# Patient Record
Sex: FEMALE | Race: WHITE | HISPANIC OR LATINO | Employment: FULL TIME | URBAN - METROPOLITAN AREA
[De-identification: names, ages, dates, MRNs, and addresses within clinical notes are randomized per-mention and may not be internally consistent; named-entity substitution may affect disease eponyms.]

---

## 2017-12-01 ENCOUNTER — OFFICE VISIT (OUTPATIENT)
Dept: OBSTETRICS AND GYNECOLOGY | Age: 47
End: 2017-12-01

## 2017-12-01 VITALS
BODY MASS INDEX: 25.23 KG/M2 | WEIGHT: 157 LBS | SYSTOLIC BLOOD PRESSURE: 122 MMHG | DIASTOLIC BLOOD PRESSURE: 72 MMHG | HEIGHT: 66 IN

## 2017-12-01 DIAGNOSIS — Z11.51 SCREENING FOR HPV (HUMAN PAPILLOMAVIRUS): ICD-10-CM

## 2017-12-01 DIAGNOSIS — Z80.41 FAMILY HISTORY OF OVARIAN CANCER: ICD-10-CM

## 2017-12-01 DIAGNOSIS — Z01.419 WELL WOMAN EXAM WITH ROUTINE GYNECOLOGICAL EXAM: Primary | ICD-10-CM

## 2017-12-01 PROCEDURE — 99386 PREV VISIT NEW AGE 40-64: CPT | Performed by: PHYSICIAN ASSISTANT

## 2017-12-01 NOTE — PROGRESS NOTES
Subjective     Chief Complaint   Patient presents with   • Gynecologic Exam     annual exam       History of Present Illness    Erick Quiñones is a 47 y.o.  who presents for annual exam.    She is not on BC.  She is not ok with pregnancy. She is tracking her cycles. Declines anything more    She does note a little discomfort vaginally after IC.  Had this when she saw me 4 years ago.  Is using lubricant. Denies odor or d/c.  No risk for STD    Her sister is Binta Quiñones and sees Dr Mills also. They are from [place], been here since .  Went back for a visit in .  Would like to get back.  She has a 23 yo son that is taking the LSAT Saturday.  Her other son is 15 yoa and attends [employer].     She has a family h/o ovarian cancer in her [facility]. Never had genetic testing done.     She is a pt of Dr Mills's.    Her menses are regular every 28-30 days, lasting 4-7 days, dysmenorrhea none   Obstetric History:  OB History      Para Term  AB Living    4 2 2  2 2    SAB TAB Ectopic Multiple Live Births    2    2         Menstrual History:     Patient's last menstrual period was 2017 (exact date).         Current contraception: none  History of abnormal Pap smear: no  Received Gardasil immunization: no  Perform regular self breast exam: yes - [facility]  Family history of uterine or ovarian cancer: yes - [facility]  Family History of colon cancer: no  Family history of breast cancer: no    Mammogram: done today.  Colonoscopy: not indicated.  DEXA: not indicated.    Exercise: moderately active  Calcium/Vitamin D: adequate intake    The following portions of the patient's history were reviewed and updated as appropriate: allergies, current medications, past family history, past medical history, past social history, past surgical history and problem list.    Review of Systems    Review of Systems   Constitutional: Negative for fatigue.   Respiratory: Negative for shortness of breath.    Gastrointestinal:  "Negative for abdominal pain.   Genitourinary: Negative for dysuria.   Neurological: Negative for headaches.   Psychiatric/Behavioral: Negative for dysphoric mood.         Objective   Physical Exam    /72  Ht 66\" (167.6 cm)  Wt 157 lb (71.2 kg)  LMP 11/16/2017 (Exact Date)  Breastfeeding? No  BMI 25.34 kg/m2    General:   alert, appears stated age and cooperative   Neck: no adenopathy and thyroid normal to palpation   Heart: regular rate and rhythm   Lungs: clear to auscultation bilaterally   Abdomen: soft and nontender   Breast: inspection negative, no nipple discharge or bleeding, no masses or nodularity palpable and implants intact   Vulva: normal   Vagina: normal mucosa, normal discharge, ph increased   Cervix: no lesions   Uterus: normal size, non-tender   Adnexa: normal adnexa and no mass, fullness, tenderness   Rectal: not indicated     Assessment/Plan   Erick was seen today for gynecologic exam.    Diagnoses and all orders for this visit:    Well woman exam with routine gynecological exam  -     IGP, Apt HPV,rfx 16 / 18,45 - ThinPrep Vial, Cervix    Screening for HPV (human papillomavirus)  -     IGP, Apt HPV,rfx 16 / 18,45 - ThinPrep Vial, Cervix    Family history of ovarian cancer  -     [facility] Hereditary Cancer Screen        All questions answered.  Breast self exam technique reviewed and patient encouraged to perform self-exam monthly.  Discussed healthy lifestyle modifications.  Recommended 30 minutes of aerobic exercise five times per week.  Discussed calcium needs to prevent osteoporosis.      Disc pap guidelines, will do pap with HPV  Disc likely BV, gave sample of Clindesse  Plan myrisk testing based on family history.  Declines flu shot           "

## 2017-12-06 LAB
CYTOLOGIST CVX/VAG CYTO: NORMAL
CYTOLOGY CVX/VAG DOC THIN PREP: NORMAL
DX ICD CODE: NORMAL
HIV 1 & 2 AB SER-IMP: NORMAL
HPV I/H RISK 4 DNA CVX QL PROBE+SIG AMP: NEGATIVE
Lab: NORMAL
OTHER STN SPEC: NORMAL
PATH REPORT.FINAL DX SPEC: NORMAL
STAT OF ADQ CVX/VAG CYTO-IMP: NORMAL

## 2017-12-14 ENCOUNTER — TELEPHONE (OUTPATIENT)
Dept: OBSTETRICS AND GYNECOLOGY | Age: 47
End: 2017-12-14

## 2017-12-14 NOTE — TELEPHONE ENCOUNTER
Left message      Let her know that her myrisk is negative. And I will be putting a copy for her in the mail.

## 2018-04-26 ENCOUNTER — OFFICE VISIT (OUTPATIENT)
Dept: OBSTETRICS AND GYNECOLOGY | Age: 48
End: 2018-04-26

## 2018-04-26 ENCOUNTER — PROCEDURE VISIT (OUTPATIENT)
Dept: OBSTETRICS AND GYNECOLOGY | Age: 48
End: 2018-04-26

## 2018-04-26 VITALS
HEIGHT: 66 IN | SYSTOLIC BLOOD PRESSURE: 110 MMHG | BODY MASS INDEX: 25.55 KG/M2 | DIASTOLIC BLOOD PRESSURE: 74 MMHG | WEIGHT: 159 LBS

## 2018-04-26 DIAGNOSIS — R30.0 BURNING WITH URINATION: ICD-10-CM

## 2018-04-26 DIAGNOSIS — Z11.3 SCREEN FOR STD (SEXUALLY TRANSMITTED DISEASE): ICD-10-CM

## 2018-04-26 DIAGNOSIS — R10.2 PELVIC PAIN: ICD-10-CM

## 2018-04-26 DIAGNOSIS — N76.1 SUBACUTE VAGINITIS: Primary | ICD-10-CM

## 2018-04-26 DIAGNOSIS — R10.2 PELVIC PAIN: Primary | ICD-10-CM

## 2018-04-26 PROBLEM — F32.A DEPRESSION: Status: ACTIVE | Noted: 2018-04-26

## 2018-04-26 LAB
BILIRUB BLD-MCNC: NEGATIVE MG/DL
GLUCOSE UR STRIP-MCNC: NEGATIVE MG/DL
KETONES UR QL: NEGATIVE
LEUKOCYTE EST, POC: NEGATIVE
NITRITE UR-MCNC: NEGATIVE MG/ML
PH UR: 7 [PH] (ref 5–8)
PROT UR STRIP-MCNC: NEGATIVE MG/DL
RBC # UR STRIP: ABNORMAL /UL
SP GR UR: 1.02 (ref 1–1.03)
UROBILINOGEN UR QL: NORMAL

## 2018-04-26 PROCEDURE — 81003 URINALYSIS AUTO W/O SCOPE: CPT | Performed by: OBSTETRICS & GYNECOLOGY

## 2018-04-26 PROCEDURE — 99214 OFFICE O/P EST MOD 30 MIN: CPT | Performed by: OBSTETRICS & GYNECOLOGY

## 2018-04-26 PROCEDURE — 76830 TRANSVAGINAL US NON-OB: CPT | Performed by: OBSTETRICS & GYNECOLOGY

## 2018-04-26 RX ORDER — IBUPROFEN 800 MG/1
800 TABLET ORAL EVERY 6 HOURS PRN
COMMUNITY
Start: 2016-06-27 | End: 2022-04-18

## 2018-04-26 NOTE — PROGRESS NOTES
GYN PROBLEM EXAM                                    2018    Subjective   Erick Quiñones is a 47 y.o.  Female,      Chief complaint:  Follow-up (ERICK IS HERE DUE TO BURNING WITH URINATION AND PAIN AFTER INTERCOURSE.  SHE HAD AN ANNUAL EXAM WITH SMITHA IN 2017. ALSO EXPERIENCING PELVIC PRESSURE, CONCERNED DUE TO MGM OVARIAN CANCER, NOT SURE WHAT AGE SHE WAS DX. )    History of Present Illness:  Erick complains of a burning sensation in the vagina after every episode of intercourse with her .  This occurs about a day or 2 after the intercourse and has been going on at least for the last year.  She has had the same partner for the last 18 years.  At this time she also gets a pressure sensation in the lower abdomen, in the pelvis, as if something is going to fall out.  She has had some relief with an over-the-counter vaginal yeast cream, a homeopathic concoction.  But it does help.     Her last episode of intercourse was , , and her symptoms started again yesterday .    She also has some dysuria, mostly on the outside of the vagina, but also a little in the distal urethra.  She has no pain during intercourse and no bleeding.  She does have a thick discharge that appears after intercourse.  She has no itching, no odor.  She has tried the Summer's Teena medicated douche before and it did help some.  She has not found any help with Clindesse vaginal cream.    She occasionally has stress urinary incontinence with sit ups or crunches.  She also loses a little urine with jumping jacks.  We discussed Kegel's exercises.    .    The following portions of the patient's history were reviewed / updated as appropriate:   allergies, current medications,  past medical history, past surgical history, past family history, past social history, and problem list.     Her mother had breast cancer at age 52 and her maternal grandmother had skin cancer (?? Melanoma) and an  "ovarian cancer in her 40s. Erick had the Biba genetic testing done and was negative.    Review of Systems: She has regular menstrual cycles every 29 days.,  Lasting 3-4 days.  She is uncertain if she can tell when ovulation occurs.  She does not have regular molimina.  Her Pap smear in December was negative, negative high-risk HPV.      /74   Ht 167.6 cm (66\")   Wt 72.1 kg (159 lb)   LMP 04/10/2018   Breastfeeding? No   BMI 25.66 kg/m²     Objective   OBGyn Exam     PHYSICAL EXAM      Well-developed, well-nourished,  female, in no distress, alert and well oriented ×3.        Abdomen not examined.               Pelvic exam :  Vulva normal.           External genitalia normal.  BUS negative.             Introitus normal.  Vaginal mucosa normal.  Normal watery white discharge.  Wet prep negative, no yeast, some possible clue cells, no Trichomonas.           Cervix normal, small cervical polyp at the external os, copious watery discharge, looks like ovulation.  Vaginal NuSwab plus taken.  No cervical motion tenderness.          Uterus anteverted, normal size, normal shape, and position, a little tender at the fundus.          Adnexa are negative bilaterally.  No tenderness.  No palpable mass.          Rectovaginal exam negative .          Mood and affect is normal.  Behavior normal.  Thought content normal.            Judgment normal.      PELVIC ULTRASOUND-because of pelvic pressure/pain        Uterus:  Anteverted.  Measures 9.4 x 5.3 x 4.4 cm, with a volume of 14.53 cc's.  Endometrial stripe measures 9.3 mm.  There is a small fundal fibroid measuring 1.1 x 1.1 cm.  It is pretty much midplane and indents the endometrial cavity a little bit of the very top.  There may also be a subcentimeter fibroid on the anterior surface of the uterus just under the serosa.  It does not shadow.  This is in the area of tenderness.     Left ovary:  These measurements are not labeled correctly.  But the " left ovary measures 2.9 x 1.8 x 2.4 cm.  Does contain several small follicles.     Right ovary:  Measured 2.5 x 2.4 x 1.9 cm, with a volume of 6.15 cc's.  It also contains a simple cyst measuring 1.4 x 1.3 cm.     Cul-de-sac:  Days no free fluid or masses.       Assessment:  Normal uterus and ovaries.  Very small fibroid(s).  Nothing to explain her pelvic symptoms.    Recommendation:  Labs for vaginitis and cervicitis ordered.  We will expect to use a trial of vaginal estrogen or Intrarosa.       Assessment/Plan   Erick was seen today for follow-up.    Diagnoses and all orders for this visit:    Subacute vaginitis  -     NuSwab Vaginitis (VG) - Swab, Vagina    Pelvic pain  -     Chlamydia trachomatis, Neisseria gonorrhoeae, PCR w/ confirmation - Swab, Cervix    Burning with urination  -     POC Urinalysis Dipstick, Automated  -     Urine Culture - Urine, Urine, Clean Catch    Screen for STD (sexually transmitted disease)  -     Chlamydia trachomatis, Neisseria gonorrhoeae, PCR w/ confirmation - Swab, Cervix      COMMENTS: The vaginal and pelvic exam is normal.  The ultrasound confirms normal ovaries and a normal-appearing uterus.  There may be a small subserosal anterior uterine fibroid.     I am uncertain of the etiology of her pain/burning.  The wet prep did have a significant number of parabasal cells, but also very many very mature epithelial cells!       If all the tests are normal think it would be worthwhile to give her a trial of either vaginal estrogen or Intrarosa vaginal suppositories.  The cervix and cervical discharge looked very well estrogenized, very clear, lots of spinnbarkeit.     Another possibility could be endometriosis with some internal adhesions that become irritated during intercourse.  Could consider suppression of ovulation with birth control pills or Depo-Provera.    Petey Mills MD  4/26/2018

## 2018-04-28 LAB
C TRACH RRNA SPEC QL NAA+PROBE: NEGATIVE
N GONORRHOEA RRNA SPEC QL NAA+PROBE: NEGATIVE

## 2018-04-29 LAB
BACTERIA UR CULT: NO GROWTH
BACTERIA UR CULT: NORMAL

## 2018-04-30 LAB
A VAGINAE DNA VAG QL NAA+PROBE: NORMAL SCORE
BVAB2 DNA VAG QL NAA+PROBE: NORMAL SCORE
C ALBICANS DNA VAG QL NAA+PROBE: NEGATIVE
C GLABRATA DNA VAG QL NAA+PROBE: NEGATIVE
MEGA1 DNA VAG QL NAA+PROBE: NORMAL SCORE
T VAGINALIS RRNA SPEC QL NAA+PROBE: NEGATIVE

## 2018-04-30 NOTE — PROGRESS NOTES
April 30, 2018.  6:40 PM.  Notify Erick that her urine culture showed no growth, the cervical swab test for gonorrhea and chlamydia were both negative, and the vaginal swab test for bacterial vaginosis, yeast, and Trichomonas, were all 3 negative.  I think her symptoms are most likely due to vaginal atrophy and would improve with the use of vaginal estrogen.

## 2018-05-01 ENCOUNTER — DOCUMENTATION (OUTPATIENT)
Dept: OBSTETRICS AND GYNECOLOGY | Age: 48
End: 2018-05-01

## 2018-05-01 NOTE — PROGRESS NOTES
May 1, 2018.  1:31 PM  Erick' cultures and labs came back negative.  We have decided to try and treat her pain related to intercourse with Intra-Ivone vaginal suppositories.

## 2019-07-23 ENCOUNTER — OFFICE VISIT (OUTPATIENT)
Dept: OBSTETRICS AND GYNECOLOGY | Age: 49
End: 2019-07-23

## 2019-07-23 VITALS
SYSTOLIC BLOOD PRESSURE: 112 MMHG | HEIGHT: 66 IN | WEIGHT: 161 LBS | BODY MASS INDEX: 25.88 KG/M2 | DIASTOLIC BLOOD PRESSURE: 66 MMHG

## 2019-07-23 DIAGNOSIS — R31.29 OTHER MICROSCOPIC HEMATURIA: ICD-10-CM

## 2019-07-23 DIAGNOSIS — N94.9 VAGINAL BURNING: Primary | ICD-10-CM

## 2019-07-23 DIAGNOSIS — N95.2 ATROPHIC VAGINITIS: ICD-10-CM

## 2019-07-23 DIAGNOSIS — Z12.31 SCREENING MAMMOGRAM, ENCOUNTER FOR: ICD-10-CM

## 2019-07-23 LAB
BILIRUB BLD-MCNC: NEGATIVE MG/DL
CLARITY, POC: CLEAR
COLOR UR: YELLOW
GLUCOSE UR STRIP-MCNC: NEGATIVE MG/DL
KETONES UR QL: NEGATIVE
LEUKOCYTE EST, POC: NEGATIVE
NITRITE UR-MCNC: NEGATIVE MG/ML
PH UR: 7 [PH] (ref 5–8)
PROT UR STRIP-MCNC: NEGATIVE MG/DL
RBC # UR STRIP: ABNORMAL /UL
SP GR UR: 1.02 (ref 1–1.03)
UROBILINOGEN UR QL: NORMAL

## 2019-07-23 PROCEDURE — 81002 URINALYSIS NONAUTO W/O SCOPE: CPT | Performed by: PHYSICIAN ASSISTANT

## 2019-07-23 PROCEDURE — 99213 OFFICE O/P EST LOW 20 MIN: CPT | Performed by: PHYSICIAN ASSISTANT

## 2019-07-23 RX ORDER — AMOXICILLIN 500 MG/1
1000 CAPSULE ORAL 2 TIMES DAILY
COMMUNITY
End: 2020-03-25

## 2019-07-23 NOTE — PROGRESS NOTES
"Subjective     Chief Complaint   Patient presents with   • Gynecologic Exam     c/o after intercourse there is a burning/itching sensation       Erick Quiñones is a 48 y.o.  whose LMP is Patient's last menstrual period was 2019 (exact date). presents with vaginal burning and discomfort with uriantion    Pt is here for vaginal discomfort  Notes burning and itching  Uses lubricant with IC  Has been given intrarosa but it was too pricey  Had full w/u with Dr Mills last year and r/o vaginal infection  dxed with atrophic vaginitis    Still having menses  They are regular  Denies MP sx's but would like ot have her hormones checked    Pt's Mom diagnosed with breast cancer again  Had mastectomy of right breast  Had lumpectomy previously    Pt overdue for annual and mammogram  Would like mammogram ordered out as she is unable to get it done here until October    Pt of dr Raymond now    No Additional Complaints Reported    The following portions of the patient's history were reviewed and updated as appropriate:vital signs, allergies, current medications, past family history, past medical history, past social history, past surgical history and problem list      Review of Systems   Genitourinary:positive for dyspareunia and burning after urination     Objective      /66   Ht 167.6 cm (66\")   Wt 73 kg (161 lb)   LMP 2019 (Exact Date)   Breastfeeding? No   BMI 25.99 kg/m²     Physical Exam    General:   alert, comfortable and no distress   Heart: regular rate and rhythm   Lungs: Not performed today.   Breast: Not performed today   Neck: na   Abdomen: {Not performed today   CVA: Not performed today   Pelvis: External genitalia: normal general appearance  Vaginal: discharge, clear  Cervix: normal appearance  Adnexa: normal bimanual exam  Uterus: normal single, nontender   Extremities: Not performed today   Neurologic: negative   Psychiatric: Normal affect, judgement, and mood       Lab Review "   Labs: Urinalysis - with micro     Imaging   No data reviewed    Assessment/Plan     ASSESSMENT  1. Vaginal burning    2. Other microscopic hematuria    3. Screening mammogram, encounter for    4. Atrophic vaginitis        PLAN  1.   Orders Placed This Encounter   Procedures   • Urine Culture - Urine, Urine, Random Void   • NuSwab BV & Candida - Swab, Vagina   • Mammo Screening Digital Tomosynthesis Bilateral With CAD   • Follicle Stimulating Hormone   • TSH   • Ambulatory Referral to Family Practice   • POC Urinalysis Dipstick   • Urinalysis With Microscopic - Urine, Random Void       2. Medications prescribed this encounter:        New Medications Ordered This Visit   Medications   • conjugated estrogens (PREMARIN) 0.625 MG/GM vaginal cream     Sig: Insert  into the vagina Daily.     Dispense:  30 g     Refill:  0       3. R/o infection but plan to start premarin. Sample given, will send in rx when pt calls. Coupon card given    4. Labs ordered but plan f/u with PCP.  Pt would like to be referred to new PCP so referral has been placed    Follow up: 4 week(s) for annual    DAFNE Patterson  7/23/2019

## 2019-07-24 LAB
FSH SERPL-ACNC: 3.3 MIU/ML
TSH SERPL DL<=0.005 MIU/L-ACNC: 4.49 MIU/ML (ref 0.27–4.2)

## 2019-07-25 LAB
A VAGINAE DNA VAG QL NAA+PROBE: NORMAL SCORE
BVAB2 DNA VAG QL NAA+PROBE: NORMAL SCORE
C ALBICANS DNA VAG QL NAA+PROBE: NEGATIVE
C GLABRATA DNA VAG QL NAA+PROBE: NEGATIVE
MEGA1 DNA VAG QL NAA+PROBE: NORMAL SCORE

## 2019-07-27 LAB
APPEARANCE UR: CLEAR
BACTERIA #/AREA URNS HPF: ABNORMAL /HPF
BACTERIA UR CULT: ABNORMAL
BACTERIA UR CULT: ABNORMAL
BILIRUB UR QL STRIP: NEGATIVE
CASTS URNS MICRO: ABNORMAL
COLOR UR: YELLOW
EPI CELLS #/AREA URNS HPF: ABNORMAL /HPF
GLUCOSE UR QL: NEGATIVE
HGB UR QL STRIP: (no result)
KETONES UR QL STRIP: NEGATIVE
LEUKOCYTE ESTERASE UR QL STRIP: NEGATIVE
NITRITE UR QL STRIP: NEGATIVE
OTHER ANTIBIOTIC SUSC ISLT: ABNORMAL
PH UR STRIP: 7 [PH] (ref 5–8)
PROT UR QL STRIP: NEGATIVE
RBC #/AREA URNS HPF: ABNORMAL /HPF
SP GR UR: 1.02 (ref 1–1.03)
UROBILINOGEN UR STRIP-MCNC: (no result) MG/DL
WBC #/AREA URNS HPF: ABNORMAL /HPF

## 2019-07-29 PROBLEM — N30.90 KLEBSIELLA CYSTITIS: Status: ACTIVE | Noted: 2019-07-29

## 2019-07-29 PROBLEM — B96.1 KLEBSIELLA CYSTITIS: Status: ACTIVE | Noted: 2019-07-29

## 2019-07-29 RX ORDER — SULFAMETHOXAZOLE AND TRIMETHOPRIM 800; 160 MG/1; MG/1
1 TABLET ORAL 2 TIMES DAILY
Qty: 6 TABLET | Refills: 0 | Status: SHIPPED | OUTPATIENT
Start: 2019-07-29 | End: 2019-08-01

## 2019-07-31 ENCOUNTER — APPOINTMENT (OUTPATIENT)
Dept: WOMENS IMAGING | Facility: HOSPITAL | Age: 49
End: 2019-07-31

## 2019-07-31 PROCEDURE — 77067 SCR MAMMO BI INCL CAD: CPT | Performed by: RADIOLOGY

## 2020-03-24 ENCOUNTER — TELEPHONE (OUTPATIENT)
Dept: OBSTETRICS AND GYNECOLOGY | Age: 50
End: 2020-03-24

## 2020-03-24 NOTE — TELEPHONE ENCOUNTER
(Lina pt) Pt has a mass the size of a golf ball that has came up near her pubic area. Pt denies pain but there is some discomfort. Pt has had it about a month but it has grown in size recently. Pt requesting an appt.     [phone]

## 2020-03-25 ENCOUNTER — OFFICE VISIT (OUTPATIENT)
Dept: OBSTETRICS AND GYNECOLOGY | Age: 50
End: 2020-03-25

## 2020-03-25 ENCOUNTER — PROCEDURE VISIT (OUTPATIENT)
Dept: OBSTETRICS AND GYNECOLOGY | Age: 50
End: 2020-03-25

## 2020-03-25 VITALS
HEIGHT: 66 IN | BODY MASS INDEX: 23.46 KG/M2 | DIASTOLIC BLOOD PRESSURE: 68 MMHG | SYSTOLIC BLOOD PRESSURE: 112 MMHG | WEIGHT: 146 LBS

## 2020-03-25 DIAGNOSIS — R19.03 RIGHT LOWER QUADRANT ABDOMINAL SWELLING, MASS AND LUMP: Primary | ICD-10-CM

## 2020-03-25 DIAGNOSIS — R10.2 PELVIC PAIN: Primary | ICD-10-CM

## 2020-03-25 PROCEDURE — 76830 TRANSVAGINAL US NON-OB: CPT | Performed by: OBSTETRICS & GYNECOLOGY

## 2020-03-25 PROCEDURE — 99213 OFFICE O/P EST LOW 20 MIN: CPT | Performed by: PHYSICIAN ASSISTANT

## 2020-03-25 RX ORDER — PROPRANOLOL HYDROCHLORIDE 10 MG/1
10 TABLET ORAL 3 TIMES DAILY PRN
COMMUNITY
Start: 2020-03-01

## 2020-03-25 NOTE — PROGRESS NOTES
"Subjective     Chief Complaint   Patient presents with   • Gynecologic Exam     c/o lump in groin area (right side) noticed a few months ago and now is getting bigger.       Erick Quiñones is a 49 y.o.  whose LMP is Patient's last menstrual period was 2020 (approximate). presents with right pelvic mass  Present for 2-3 months  Seems to be getting bigger  Not painful  Can only see it when standing  No h/o this  BM's normal  No new med hx    Previous pt of Dr Mills, now pt of Dr Raymond    No Additional Complaints Reported    The following portions of the patient's history were reviewed and updated as appropriate:vital signs, allergies, current medications, past family history, past medical history, past social history, past surgical history and problem list      Review of Systems   Genitourinary:positive for right groin mass    Objective      /68   Ht 167.6 cm (66\")   Wt 66.2 kg (146 lb)   LMP 2020 (Approximate)   Breastfeeding No   BMI 23.57 kg/m²     Physical Exam    General:   alert, comfortable and no distress   Heart: Not performed today   Lungs: Not performed today.   Breast: Not performed today   Neck: na   Abdomen: {normal findings: umbilicus normal, symmetric, no masses palpable, no organomegaly, bowel sounds normal, soft, non-tender, visible \"swelling\" noted in right lower quadrant when pt is standing, not visible when pt is supine   CVA: Not performed today   Pelvis: Adnexa: normal bimanual exam  Uterus: normal single, nontender   Extremities: Not performed today   Neurologic: negative   Psychiatric: Normal affect, judgement, and mood       Lab Review   Labs: Urinalysis - with micro     Imaging   Ultrasound - Pelvic Vaginal    Assessment/Plan     ASSESSMENT  1. Right lower quadrant abdominal swelling, mass and lump        PLAN  1.   Orders Placed This Encounter   Procedures   • Ambulatory Referral to General Surgery       2. Possible hernia?  Will refer to General surgery " for further w/u. Disc that if she starts to have pain or acute onset of pain, I would recommend ER.  Will monitor sx's in the meantime.  Plan annual in the fall. Mammogram utd    Follow up: 5 month(s)    DAFNE Patterson  3/25/2020

## 2020-05-11 NOTE — PROGRESS NOTES
"SURGERY  Erick Quiñones   1970 05/13/2020    Chief Complaint:  Right lower quadrant \"knot\"    HPI    Ms. Quiñones is a nice 49 y.o. female referred by DAFNE Garrison, with right groin mass that has been present for about 3 months, visible with standing.  She first noted this about 4 months ago, initially feeling it and then being able to visualize it.  It is better when she lays back.  She is not really having much pain with it.    She has had an US which showed a small left ovarian follicle, otherwise normal.  She is had no further imaging.    She does workout extensively and is actually a  at [employer].  She does extreme sports.  Thus she intends to continue being active and her physique is putting quite a bit of stress.  She has no family history of hernias and thus is not genetically inclined.    From a surgical standpoint she does have difficulty with nausea and vomiting.  She takes occasional Advil but would do well eliminating that a week prior to surgery without difficulty.    In addition she needs screening colonoscopy.  There is no family history of colon cancer    Past Medical History:   Diagnosis Date   • Anxiety and depression    • Asthma    • High blood pressure      Past Surgical History:   Procedure Laterality Date   • BREAST AUGMENTATION Bilateral      Family History   Problem Relation Age of Onset   • Hypertension Father    • Diabetes type II Father    • Skin cancer Maternal Grandmother         face   • Ovarian cancer Maternal Grandmother    • Skin cancer Maternal Uncle    • Breast cancer Mother    • Colon cancer Mother    • No Known Problems Sister    • No Known Problems Son    • No Known Problems Paternal Grandmother    • No Known Problems Maternal Aunt    • No Known Problems Paternal Aunt    • No Known Problems Son    • No Known Problems Maternal Grandfather    • No Known Problems Paternal Grandfather    • BRCA 1/2 Neg Hx    • Endometrial cancer Neg Hx      Social " "History     Socioeconomic History   • Marital status: Significant Other     Spouse name: MORENO   • Number of children: 2   • Years of education: Not on file   • Highest education level: Not on file   Occupational History   • Occupation: Teacher     Employer: [employer]     Comment: [employer]   Tobacco Use   • Smoking status: Never Smoker   • Smokeless tobacco: Never Used   Substance and Sexual Activity   • Alcohol use: No   • Drug use: No   • Sexual activity: Yes     Partners: Male     Birth control/protection: None     Comment: partner = MORENO         Current Outpatient Medications:   •  Cholecalciferol (VITAMIN D PO), Take 50,000 mg by mouth 1 (One) Time Per Week., Disp: , Rfl:   •  lisinopril (PRINIVIL,ZESTRIL) 2.5 MG tablet, Take 2.5 mg by mouth Daily., Disp: , Rfl:   •  propranolol (INDERAL) 10 MG tablet, Take 10 mg by mouth 3 (Three) Times a Day As Needed., Disp: , Rfl:   •  sertraline (ZOLOFT) 50 MG tablet, Take 50 mg by mouth Daily., Disp: , Rfl:   •  ibuprofen (ADVIL,MOTRIN) 800 MG tablet, Take 800 mg by mouth Every 6 (Six) Hours As Needed for Mild Pain  or Headache., Disp: , Rfl:     No Known Allergies  Review of Systems  Positive for abdominal mass.  No chest pain or shortness of breath  All other systems reviewed and negative.    Vitals:    05/13/20 1255   Weight: 67 kg (147 lb 12.8 oz)   Height: 162.6 cm (64\")       PHYSICAL EXAM:    Ht 162.6 cm (64\")   Wt 67 kg (147 lb 12.8 oz)   BMI 25.37 kg/m²   Body mass index is 25.37 kg/m².    Constitutional: well developed, well nourished, appears  healthy, stated age, fit  Eyes: sclera nonicteric, conjunctiva not injected   ENMT: Hearing intact, trachea midline, dentitia not witnessed patient being masked  CVS: RRR, no murmur, peripheral edema not present  Respiratory: CTA, normal respiratory effort   Gastrointestinal: no hepatosplenomegaly, abdomen soft, nontender, abdominal hernia small about 1 cm at the " umbilicus  Genitourinary: inguinal hernia palpable on the right side, more notable when standing, defect likely about 1-1/2 cm, without a great deal of tissue protruding through the external ring  Musculoskeletal: gait normal, muscle mass well-defined and good in tone and quantity  Skin: warm and dry, no rashes visible  Neurological: awake and alert, seems to have reasonable capacity for understanding for medical decision making  Psychiatric: appears to have reasonable judgement, pleasant  Lymphatics: no cervical adenopathy    Radiographic/Lab Findings: Ultrasound report    Pamphlet reviewed: Hernia including an extraperitoneal approach, and the use of mesh    IMPRESSION:  · Right inguinal hernia, minimally symptomatic but in a patient that engages in extreme sports and wants to stay active and needs to stay active to maintain her occupation of a .  No definitive left inguinal hernia  · Umbilical hernia small, which would be best addressed if she is going to have the inguinal hernia addressed at the same time.  · Depression on sertraline  · Asthma, not requiring medication  · Panic attacks with propranolol use  · Hypertension on lisinopril  · Need for screening colonoscopy    PLAN:  · Laparoscopic right inguinal hernia repair, extraperitoneal, with mesh placement, and evaluation of the left inguinal canal at the same time.  Discussed the risk of recurrence, bleeding  · Umbilical hernia repair at the same time open  · Screening colonoscopy  · METS score 49 predominantly because she could wait to have all of these done.  · Hold Advil 1 week  · 8 mg Zofran in preop due to expressed prior issues with postoperative nausea and vomiting    Frances Garcia MD  05/13/2020  3:27 PM

## 2020-05-13 ENCOUNTER — OFFICE VISIT (OUTPATIENT)
Dept: SURGERY | Facility: CLINIC | Age: 50
End: 2020-05-13

## 2020-05-13 ENCOUNTER — PREP FOR SURGERY (OUTPATIENT)
Dept: OTHER | Facility: HOSPITAL | Age: 50
End: 2020-05-13

## 2020-05-13 VITALS — BODY MASS INDEX: 25.23 KG/M2 | HEIGHT: 64 IN | WEIGHT: 147.8 LBS

## 2020-05-13 DIAGNOSIS — K40.90 RIGHT INGUINAL HERNIA: ICD-10-CM

## 2020-05-13 DIAGNOSIS — Z12.11 ENCOUNTER FOR SCREENING COLONOSCOPY: Primary | ICD-10-CM

## 2020-05-13 DIAGNOSIS — K42.9 UMBILICAL HERNIA WITHOUT OBSTRUCTION AND WITHOUT GANGRENE: ICD-10-CM

## 2020-05-13 DIAGNOSIS — K40.90 RIGHT INGUINAL HERNIA: Primary | ICD-10-CM

## 2020-05-13 PROCEDURE — 99244 OFF/OP CNSLTJ NEW/EST MOD 40: CPT | Performed by: SURGERY

## 2020-05-13 RX ORDER — SODIUM CHLORIDE 0.9 % (FLUSH) 0.9 %
3 SYRINGE (ML) INJECTION EVERY 12 HOURS SCHEDULED
Status: CANCELLED | OUTPATIENT
Start: 2020-05-13

## 2020-05-13 RX ORDER — CEFAZOLIN SODIUM 2 G/100ML
2 INJECTION, SOLUTION INTRAVENOUS ONCE
Status: CANCELLED | OUTPATIENT
Start: 2020-05-13 | End: 2020-05-13

## 2020-05-13 RX ORDER — LISINOPRIL 2.5 MG/1
2.5 TABLET ORAL DAILY
COMMUNITY
Start: 2020-04-20 | End: 2020-07-19

## 2020-05-13 RX ORDER — ACETAMINOPHEN 500 MG
1000 TABLET ORAL ONCE
Status: CANCELLED | OUTPATIENT
Start: 2020-05-13 | End: 2020-05-13

## 2020-05-13 RX ORDER — OXYCODONE HCL 10 MG/1
10 TABLET, FILM COATED, EXTENDED RELEASE ORAL ONCE
Status: CANCELLED | OUTPATIENT
Start: 2020-05-13 | End: 2020-05-13

## 2020-05-13 RX ORDER — SODIUM CHLORIDE 0.9 % (FLUSH) 0.9 %
10 SYRINGE (ML) INJECTION AS NEEDED
Status: CANCELLED | OUTPATIENT
Start: 2020-05-13

## 2020-07-30 ENCOUNTER — TELEPHONE (OUTPATIENT)
Dept: SURGERY | Facility: CLINIC | Age: 50
End: 2020-07-30

## 2020-07-30 NOTE — TELEPHONE ENCOUNTER
Pt left a message stating she wanted to cancel her upcoming c-scope. I have taken her off the schedule and she can call back to r/s for a later date.

## 2021-03-26 ENCOUNTER — BULK ORDERING (OUTPATIENT)
Dept: CASE MANAGEMENT | Facility: OTHER | Age: 51
End: 2021-03-26

## 2021-03-26 DIAGNOSIS — Z23 IMMUNIZATION DUE: ICD-10-CM

## 2021-07-15 ENCOUNTER — OFFICE VISIT (OUTPATIENT)
Dept: OBSTETRICS AND GYNECOLOGY | Age: 51
End: 2021-07-15

## 2021-07-15 VITALS
SYSTOLIC BLOOD PRESSURE: 126 MMHG | DIASTOLIC BLOOD PRESSURE: 70 MMHG | WEIGHT: 158 LBS | HEIGHT: 65 IN | BODY MASS INDEX: 26.33 KG/M2

## 2021-07-15 DIAGNOSIS — Z01.419 ENCOUNTER FOR GYNECOLOGICAL EXAMINATION WITHOUT ABNORMAL FINDING: Primary | ICD-10-CM

## 2021-07-15 DIAGNOSIS — Z11.51 SCREENING FOR HPV (HUMAN PAPILLOMAVIRUS): ICD-10-CM

## 2021-07-15 DIAGNOSIS — Z12.4 SCREENING FOR CERVICAL CANCER: ICD-10-CM

## 2021-07-15 DIAGNOSIS — N39.46 MIXED STRESS AND URGE INCONTINENCE: ICD-10-CM

## 2021-07-15 PROCEDURE — 99396 PREV VISIT EST AGE 40-64: CPT | Performed by: OBSTETRICS & GYNECOLOGY

## 2021-07-15 PROCEDURE — 99212 OFFICE O/P EST SF 10 MIN: CPT | Performed by: OBSTETRICS & GYNECOLOGY

## 2021-07-15 RX ORDER — CETIRIZINE HYDROCHLORIDE 10 MG/1
10 TABLET ORAL DAILY
COMMUNITY
Start: 2021-03-29 | End: 2022-04-18

## 2021-07-15 RX ORDER — LANOLIN ALCOHOL/MO/W.PET/CERES
325 CREAM (GRAM) TOPICAL DAILY
COMMUNITY
Start: 2021-04-14

## 2021-07-15 RX ORDER — ERGOCALCIFEROL 1.25 MG/1
CAPSULE ORAL
COMMUNITY
Start: 2021-07-02

## 2021-07-15 RX ORDER — FLUTICASONE PROPIONATE 50 MCG
SPRAY, SUSPENSION (ML) NASAL
COMMUNITY
Start: 2021-07-02

## 2021-07-15 RX ORDER — LEVOTHYROXINE SODIUM 0.03 MG/1
TABLET ORAL
COMMUNITY
Start: 2021-06-01

## 2021-07-15 RX ORDER — LISINOPRIL 5 MG/1
TABLET ORAL
COMMUNITY
Start: 2021-07-01 | End: 2023-01-18

## 2021-07-15 NOTE — PROGRESS NOTES
Routine Annual Visit    7/15/2021    Patient: Erick Quiñones          MR#:[id number]      Chief Complaint   Patient presents with   • Gynecologic Exam     New gyn: former  pt.,annual,last pap ,mammo @ [facility],cologuard 3/21,wnl       History of Present Illness    50 y.o. female  who presents for annual exam.   New pt to me  Former [facility] pt, her sister sees me  Cramping after menses, menses regular and no issues, no HF or NS  Also some mixed incontinence issues, sometimes wears a pad  Saw calobrace for some laser treatment ??  Very little help  Due for pap, mammo UTD          Patient's last menstrual period was 2021 (approximate).  Obstetric History:  OB History        4    Para   2    Term   2            AB   2    Living   2       SAB   2    TAB        Ectopic        Molar        Multiple        Live Births   2               Menstrual History:     Patient's last menstrual period was 2021 (approximate).       Sexual History:       ________________________________________  Patient Active Problem List   Diagnosis   • Depression   • Asthma   • Burning with urination   • Pelvic pain   • Subacute vaginitis   • Klebsiella cystitis   • Encounter for screening colonoscopy       Past Medical History:   Diagnosis Date   • Anxiety and depression    • Asthma    • High blood pressure        Past Surgical History:   Procedure Laterality Date   • BREAST AUGMENTATION Bilateral        Social History     Tobacco Use   Smoking Status Never Smoker   Smokeless Tobacco Never Used       has a current medication list which includes the following prescription(s): cetirizine, ferrous sulfate, fluticasone, ibuprofen, levothyroxine, lisinopril, propranolol, sertraline, and vitamin d.  ________________________________________    Current contraception: abstinence  History of abnormal Pap smear: no  Family history of Breast cancer: mother  Family history of uterine or ovarian cancer: yes  "- GM  Family History of colon cancer/colon polyps: yes - mother  History of abnormal mammogram: no      The following portions of the patient's history were reviewed and updated as appropriate: allergies, current medications, past family history, past medical history, past social history, past surgical history and problem list.    Review of Systems    Pertinent items are noted in HPI.     Objective   Physical Exam    /70   Ht 165.1 cm (65\")   Wt 71.7 kg (158 lb)   LMP 06/19/2021 (Approximate)   Breastfeeding No   BMI 26.29 kg/m²    BP Readings from Last 3 Encounters:   07/15/21 126/70   03/25/20 112/68   07/23/19 112/66      Wt Readings from Last 3 Encounters:   07/15/21 71.7 kg (158 lb)   05/13/20 67 kg (147 lb 12.8 oz)   03/25/20 66.2 kg (146 lb)      BMI: Estimated body mass index is 26.29 kg/m² as calculated from the following:    Height as of this encounter: 165.1 cm (65\").    Weight as of this encounter: 71.7 kg (158 lb).      General:   alert, appears stated age and cooperative   Abdomen: soft, non-tender, without masses or organomegaly   Breast: inspection negative, no nipple discharge or bleeding, no masses or nodularity palpable   Vulva: normal   Vagina: normal mucosa   Cervix: no cervical motion tenderness and small cervical polyp present   Uterus: normal size, mobile and non-tender   Adnexa: no mass, fullness, tenderness     Assessment:    1. Normal annual exam   Assessment     ICD-10-CM ICD-9-CM   1. Encounter for gynecological examination without abnormal finding  Z01.419 V72.31   2. Screening for cervical cancer  Z12.4 V76.2   3. Screening for HPV (human papillomavirus)  Z11.51 V73.81   4. Mixed stress and urge incontinence  N39.46 788.33     Plan:    Plan     []  Mammogram request made  [x]  PAP done  []  Labs:   []  GC/Chl/TV  []  DEXA scan   []  Referral for colonoscopy:       Diagnoses and all orders for this visit:    1. Encounter for gynecological examination without abnormal finding " (Primary)    2. Screening for cervical cancer  -     IGP, Apt HPV,rfx 16 / 18,45    3. Screening for HPV (human papillomavirus)  -     IGP, Apt HPV,rfx 16 / 18,45    4. Mixed stress and urge incontinence  -     Ambulatory Referral to Gynecologic Urology      HO given for UI and menopause      Counseling:  --Nutrition: Stressed importance of moderation and caloric balance, stressed fresh fruit and vegetables  --Exercise: Stressed the importance of regular exercise. 3-5 times weekly   - Discussed screening mammogram recommendations.   --Discussed benefits of screening colonoscopy- age 45 unless FH  --Discussed pap smear screening recommendations

## 2021-07-19 LAB
CYTOLOGIST CVX/VAG CYTO: NORMAL
CYTOLOGY CVX/VAG DOC CYTO: NORMAL
CYTOLOGY CVX/VAG DOC THIN PREP: NORMAL
DX ICD CODE: NORMAL
HIV 1 & 2 AB SER-IMP: NORMAL
HPV I/H RISK 4 DNA CVX QL PROBE+SIG AMP: NEGATIVE
OTHER STN SPEC: NORMAL
STAT OF ADQ CVX/VAG CYTO-IMP: NORMAL

## 2022-04-18 ENCOUNTER — OFFICE VISIT (OUTPATIENT)
Dept: CARDIOLOGY | Facility: CLINIC | Age: 52
End: 2022-04-18

## 2022-04-18 VITALS
BODY MASS INDEX: 27.32 KG/M2 | HEIGHT: 65 IN | HEART RATE: 72 BPM | SYSTOLIC BLOOD PRESSURE: 102 MMHG | WEIGHT: 164 LBS | DIASTOLIC BLOOD PRESSURE: 60 MMHG

## 2022-04-18 DIAGNOSIS — R07.89 CHEST PAIN, ATYPICAL: Primary | ICD-10-CM

## 2022-04-18 PROCEDURE — 99204 OFFICE O/P NEW MOD 45 MIN: CPT | Performed by: INTERNAL MEDICINE

## 2022-04-18 PROCEDURE — 93000 ELECTROCARDIOGRAM COMPLETE: CPT | Performed by: INTERNAL MEDICINE

## 2022-04-18 NOTE — PROGRESS NOTES
[facility]      Patient Name: Erick Quiñones  :1970  Age: 51 y.o.  Encounter Provider:  Kelby Mccarty Jr, MD      Chief Complaint: Initial evaluation and management of chest pain      HPI  Erick Quiñones is a 51 y.o. female presents for initial evaluation and management of chest pain.  She works out 3-4 times weekly on a stationary bicycle and states that when she pushes too hard she will have a sharp discomfort which is worse with deep inspiration.  Does not happen every time she works out and sometimes it happens while she is sleeping.  She notes some mild increase in dyspnea on exertion when going up the stairs which she feels is new to her.  She states that she has gained a bit of weight over the last year and wants to be started on phentermine but given the constellation of complaints she was sent for cardiology evaluation before starting on appetite suppressants.  She had history of elevated blood pressure but was taken off of antihypertensives.  She takes propranolol as needed for anxiety.  She is being treated for thyroid disease.  She is a lifelong non-smoker who denies alcohol or illicit drug use.  Her mother had a heart attack at age 79.      The following portions of the patient's history were reviewed and updated as appropriate: allergies, current medications, past family history, past medical history, past social history, past surgical history and problem list.      Review of Systems   Constitutional: Negative for chills and fever.   HENT: Negative for hoarse voice and sore throat.    Eyes: Negative for double vision and photophobia.   Cardiovascular: Positive for chest pain and dyspnea on exertion. Negative for leg swelling, near-syncope, orthopnea, palpitations, paroxysmal nocturnal dyspnea and syncope.   Respiratory: Negative for cough and wheezing.    Skin: Negative for poor wound healing and rash.   Musculoskeletal: Negative for arthritis and joint swelling.  "  Gastrointestinal: Negative for bloating, abdominal pain, hematemesis and hematochezia.   Neurological: Negative for dizziness and focal weakness.   Psychiatric/Behavioral: Negative for depression and suicidal ideas.       OBJECTIVE:   Vital Signs  Vitals:    04/18/22 1419   BP: 102/60   Pulse: 72     Estimated body mass index is 27.29 kg/m² as calculated from the following:    Height as of this encounter: 165.1 cm (65\").    Weight as of this encounter: 74.4 kg (164 lb).    Vitals reviewed.   Constitutional:       Appearance: Healthy appearance. Not in distress.   Neck:      Vascular: No JVR. JVD normal.   Pulmonary:      Effort: Pulmonary effort is normal.      Breath sounds: Normal breath sounds. No wheezing. No rhonchi. No rales.   Chest:      Chest wall: Not tender to palpatation.   Cardiovascular:      PMI at left midclavicular line. Normal rate. Regular rhythm. Normal S1. Normal S2.      Murmurs: There is no murmur.      No gallop. No click. No rub.   Pulses:     Intact distal pulses.   Edema:     Peripheral edema absent.   Abdominal:      General: Bowel sounds are normal.      Palpations: Abdomen is soft.      Tenderness: There is no abdominal tenderness.   Musculoskeletal: Normal range of motion.         General: No tenderness. Skin:     General: Skin is warm and dry.   Neurological:      General: No focal deficit present.      Mental Status: Alert and oriented to person, place and time.           ECG 12 Lead    Date/Time: 4/18/2022 2:52 PM  Performed by: Kelby Mccarty Jr., MD  Authorized by: Kelby Mccarty Jr., MD   Comparison: not compared with previous ECG   Previous ECG: no previous ECG available  Rhythm: sinus rhythm    Clinical impression: normal ECG                  ASSESSMENT:     Atypical chest pain  Exertional dyspnea  Hypothyroidism    PLAN OF CARE:     1. Atypical chest pain -very atypical and unlikely to be cardiac however recurrent and associated with exertional dyspnea we will plan for " treadmill stress study.  EKG is normal.  2. Exertional dyspnea -likely multifactorial with some mild weight gain over the last couple of years and just getting back to physical activity.  Follow diagnostic testing results for further treatment recommendations.  3. Hypothyroidism -defer to PCP for ongoing work-up and management    Return to clinic 3 months           Discharge Medications          Accurate as of April 18, 2022  2:50 PM. If you have any questions, ask your nurse or doctor.            Continue These Medications      Instructions Start Date   ferrous sulfate 325 (65 FE) MG EC tablet   325 mg, Oral, Daily      fluticasone 50 MCG/ACT nasal spray  Commonly known as: FLONASE   No dose, route, or frequency recorded.      levothyroxine 25 MCG tablet  Commonly known as: SYNTHROID, LEVOTHROID   No dose, route, or frequency recorded.      lisinopril 5 MG tablet  Commonly known as: PRINIVIL,ZESTRIL   No dose, route, or frequency recorded.      propranolol 10 MG tablet  Commonly known as: INDERAL   10 mg, Oral, 3 Times Daily PRN      sertraline 50 MG tablet  Commonly known as: ZOLOFT   50 mg, Oral, Daily      vitamin D 1.25 MG (51220 UT) capsule capsule  Commonly known as: ERGOCALCIFEROL   No dose, route, or frequency recorded.         Stop These Medications    cetirizine 10 MG tablet  Commonly known as: zyrTEC  Stopped by: Kelby Mccarty Jr, MD     ibuprofen 800 MG tablet  Commonly known as: ADVIL,MOTRIN  Stopped by: Kelby Mccarty Jr, MD            Thank you for allowing me to participate in the care of your patient,      Sincerely,   Kelby Mccarty Jr, MD  [facility]  04/18/22  14:50 EDT

## 2023-01-18 ENCOUNTER — OFFICE VISIT (OUTPATIENT)
Dept: OBSTETRICS AND GYNECOLOGY | Age: 53
End: 2023-01-18
Payer: COMMERCIAL

## 2023-01-18 VITALS
HEIGHT: 65 IN | WEIGHT: 161 LBS | SYSTOLIC BLOOD PRESSURE: 122 MMHG | DIASTOLIC BLOOD PRESSURE: 74 MMHG | BODY MASS INDEX: 26.82 KG/M2

## 2023-01-18 DIAGNOSIS — Z12.31 BREAST CANCER SCREENING BY MAMMOGRAM: ICD-10-CM

## 2023-01-18 DIAGNOSIS — Z12.4 ENCOUNTER FOR PAPANICOLAOU SMEAR FOR CERVICAL CANCER SCREENING: ICD-10-CM

## 2023-01-18 DIAGNOSIS — F52.0 HYPOACTIVE SEXUAL DESIRE DISORDER: ICD-10-CM

## 2023-01-18 DIAGNOSIS — Z01.419 WELL WOMAN EXAM WITH ROUTINE GYNECOLOGICAL EXAM: Primary | ICD-10-CM

## 2023-01-18 DIAGNOSIS — Z11.3 SCREEN FOR STD (SEXUALLY TRANSMITTED DISEASE): ICD-10-CM

## 2023-01-18 DIAGNOSIS — N84.1 CERVICAL POLYP: ICD-10-CM

## 2023-01-18 DIAGNOSIS — Z11.51 SCREENING FOR HPV (HUMAN PAPILLOMAVIRUS): ICD-10-CM

## 2023-01-18 PROCEDURE — 99214 OFFICE O/P EST MOD 30 MIN: CPT | Performed by: PHYSICIAN ASSISTANT

## 2023-01-18 PROCEDURE — 57500 BIOPSY OF CERVIX: CPT | Performed by: PHYSICIAN ASSISTANT

## 2023-01-18 PROCEDURE — 99396 PREV VISIT EST AGE 40-64: CPT | Performed by: PHYSICIAN ASSISTANT

## 2023-01-18 RX ORDER — FLIBANSERIN 100 MG/1
1 TABLET, FILM COATED ORAL NIGHTLY
Qty: 30 TABLET | Refills: 3 | Status: SHIPPED | OUTPATIENT
Start: 2023-01-18

## 2023-01-18 NOTE — PROGRESS NOTES
Subjective     Chief Complaint   Patient presents with   • Gynecologic Exam     annual exam last pap 07/15/2021 neg/neg, m/g ,  c/scope  c/o occ. Bleeding.       History of Present Illness    Erick Quiñones is a 52 y.o.  who presents for annual exam.    She is still having menses  No MP sx's  Doesn't use condoms or anything  Has not been able to get pregnant since her son was born, 21 yoa    Notes PCB  Is back with her ex and trying to make things work  Would like to do std testing     Has had issues with decreased libido for a long time  Unable to climax  Says this has been her issue for a long   Is taking zoloft but doesn't feel as if this is causing her sx's   Would be ok not having sex but would like to try something to see if she can improve her libido    Does eat well and exercises  No smoking or alcohol    Has menses are coming regularly still and denies MP sx's    Sees pcp routinely  Last labs showed TSH slightly high  No changes in her thyroid dose    Last one was slightly lighter and only 2 days      Her menses are regular every 28-30 days, lasting 4-7 days, dysmenorrhea none   Obstetric History:  OB History        4    Para   2    Term   2            AB   2    Living   2       SAB   2    IAB        Ectopic        Molar        Multiple        Live Births   2               Menstrual History:     Patient's last menstrual period was 2022 (exact date).         Current contraception: none  History of abnormal Pap smear: no  Received Gardasil immunization: no  Perform regular self breast exam: yes - occl  Family history of uterine or ovarian cancer: no  Family History of colon cancer: no  Family history of breast cancer: yes - mom, pt tested negative for gene    Mammogram: ordered.  Colonoscopy: up to date.  DEXA: not indicated.    Exercise: moderately active  Calcium/Vitamin D: adequate intake    The following portions of the patient's history were reviewed and updated as  "appropriate: allergies, current medications, past family history, past medical history, past social history, past surgical history and problem list.    Review of Systems   Genitourinary:        Decrease libido  PCB   All other systems reviewed and are negative.      Review of Systems   Constitutional: Negative for fatigue.   Respiratory: Negative for shortness of breath.    Gastrointestinal: Negative for abdominal pain.   Genitourinary: Negative for dysuria.   Neurological: Negative for headaches.   Psychiatric/Behavioral: Negative for dysphoric mood.         Objective   Physical Exam    /74   Ht 165.1 cm (65\")   Wt 73 kg (161 lb)   LMP 12/26/2022 (Exact Date)   BMI 26.79 kg/m²   General:   alert, comfortable and no distress   Heart: regular rate and rhythm   Lungs: clear to auscultation bilaterally   Breast: normal appearance, no masses or tenderness, Inspection negative, No nipple retraction or dimpling, No nipple discharge or bleeding, No axillary or supraclavicular adenopathy, Normal to palpation without dominant masses, positive findings: implants bilaterally   Neck: no adenopathy and no carotid bruit   Abdomen: normal findings: soft, non-tender   CVA: Not performed today   Pelvis: External genitalia: normal general appearance  Vaginal: normal mucosa without prolapse or lesions  Cervix: normal appearance, thin prep PAP obtained and cervical polyp noted and removed without incident  Adnexa: normal bimanual exam  Uterus: normal single, nontender   Extremities: Not performed today   Neurologic: negative   Psychiatric: Normal affect, judgement, and mood     Assessment & Plan   Diagnoses and all orders for this visit:    1. Well woman exam with routine gynecological exam (Primary)    2. Breast cancer screening by mammogram  -     Mammo Screening Digital Tomosynthesis Bilateral With CAD; Future    3. Hypoactive sexual desire disorder    4. Cervical polyp  -     Reference Histopathology    5. Encounter for " Papanicolaou smear for cervical cancer screening  -     Cancel: IGP, Aptima HPV, Rfx 16 / 18,45  -     GP,CTNG,APTIMA HPV    6. Screening for HPV (human papillomavirus)  -     Cancel: IGP, Aptima HPV, Rfx 16 / 18,45  -     GP,CTNG,APTIMA HPV    7. Screen for STD (sexually transmitted disease)  -     GP,CTNG,APTIMA HPV    Other orders  -     Flibanserin (Addyi) 100 MG tablet; Take 1 tablet by mouth Every Night.  Dispense: 30 tablet; Refill: 3        All questions answered.  Breast self exam technique reviewed and patient encouraged to perform self-exam monthly.  Discussed healthy lifestyle modifications.  Recommended 30 minutes of aerobic exercise five times per week.  Discussed calcium needs to prevent osteoporosis.    Pap done, agreeable to std testing as well  Removed polyp, should help with PCB  Try Addyi for HSSD, will need to find coupon to cover cost  Reviewed recent labs in Dec and essentially normal other then slightly high tsh level, will recheck with pcp

## 2023-01-20 LAB
DX ICD CODE: NORMAL
DX ICD CODE: NORMAL
PATH REPORT.FINAL DX SPEC: NORMAL
PATH REPORT.GROSS SPEC: NORMAL
PATH REPORT.SITE OF ORIGIN SPEC: NORMAL
PATHOLOGIST NAME: NORMAL
PAYMENT PROCEDURE: NORMAL

## 2023-01-23 LAB
C TRACH RRNA CVX QL NAA+PROBE: NEGATIVE
CYTOLOGIST CVX/VAG CYTO: NORMAL
CYTOLOGY CVX/VAG DOC CYTO: NORMAL
CYTOLOGY CVX/VAG DOC THIN PREP: NORMAL
DX ICD CODE: NORMAL
HIV 1 & 2 AB SER-IMP: NORMAL
HPV I/H RISK 4 DNA CVX QL PROBE+SIG AMP: NEGATIVE
Lab: NORMAL
N GONORRHOEA RRNA CVX QL NAA+PROBE: NEGATIVE
OTHER STN SPEC: NORMAL
STAT OF ADQ CVX/VAG CYTO-IMP: NORMAL

## 2023-04-03 ENCOUNTER — TELEPHONE (OUTPATIENT)
Dept: OBSTETRICS AND GYNECOLOGY | Age: 53
End: 2023-04-03
Payer: COMMERCIAL

## 2023-04-03 DIAGNOSIS — R92.8 ABNORMAL FINDING ON BREAST IMAGING: Primary | ICD-10-CM

## 2023-04-03 NOTE — TELEPHONE ENCOUNTER
error  
Detail Level: Generalized
Hide Include Location In Plan Question?: No
Detail Level: Detailed

## 2023-04-03 NOTE — TELEPHONE ENCOUNTER
Tried to call pt and unable to reach. I placed orders as requested. Please advise her f/u imaging needed for mammogram and ordered. Please schedule her a f/u visit with Dr. Salomon to review as she is her primary GYN MD. Also send letter to pt regarding need for f/u imaging at [facility].

## 2023-04-03 NOTE — TELEPHONE ENCOUNTER
Called pt regarding abnormal breast imaging and need for f/u imaging. Unable to reach pt at listed number or leave message. Ordered f/u imaging requested by [facility] radiologist. Forwarded message to staff to call pt and send letter regarding abnormal mammogram. Also advised they schedule f/u visit to review further.

## 2023-04-20 DIAGNOSIS — R92.8 ABNORMAL FINDING ON BREAST IMAGING: ICD-10-CM

## 2023-11-01 ENCOUNTER — HOSPITAL ENCOUNTER (EMERGENCY)
Facility: HOSPITAL | Age: 53
Discharge: HOME | End: 2023-11-01
Attending: EMERGENCY MEDICINE | Admitting: EMERGENCY MEDICINE
Payer: COMMERCIAL

## 2023-11-01 ENCOUNTER — APPOINTMENT (OUTPATIENT)
Dept: CT IMAGING | Facility: HOSPITAL | Age: 53
End: 2023-11-01
Payer: COMMERCIAL

## 2023-11-01 ENCOUNTER — APPOINTMENT (OUTPATIENT)
Dept: GENERAL RADIOLOGY | Facility: HOSPITAL | Age: 53
End: 2023-11-01
Payer: COMMERCIAL

## 2023-11-01 VITALS
SYSTOLIC BLOOD PRESSURE: 148 MMHG | TEMPERATURE: 97.6 F | DIASTOLIC BLOOD PRESSURE: 89 MMHG | HEART RATE: 60 BPM | RESPIRATION RATE: 18 BRPM | OXYGEN SATURATION: 100 %

## 2023-11-01 DIAGNOSIS — R07.9 CHEST PAIN, UNSPECIFIED TYPE: Primary | ICD-10-CM

## 2023-11-01 DIAGNOSIS — R91.1 PULMONARY NODULE: ICD-10-CM

## 2023-11-01 DIAGNOSIS — R74.01 TRANSAMINITIS: ICD-10-CM

## 2023-11-01 LAB
ALBUMIN SERPL-MCNC: 4.7 G/DL (ref 3.5–5.2)
ALBUMIN/GLOB SERPL: 1.6 G/DL
ALP SERPL-CCNC: 60 U/L (ref 39–117)
ALT SERPL W P-5'-P-CCNC: 69 U/L (ref 1–33)
ANION GAP SERPL CALCULATED.3IONS-SCNC: 8.7 MMOL/L (ref 5–15)
AST SERPL-CCNC: 107 U/L (ref 1–32)
BASOPHILS # BLD AUTO: 0.04 10*3/MM3 (ref 0–0.2)
BASOPHILS NFR BLD AUTO: 0.6 % (ref 0–1.5)
BILIRUB SERPL-MCNC: 0.4 MG/DL (ref 0–1.2)
BUN SERPL-MCNC: 10 MG/DL (ref 6–20)
BUN/CREAT SERPL: 13.9 (ref 7–25)
CALCIUM SPEC-SCNC: 9.9 MG/DL (ref 8.6–10.5)
CHLORIDE SERPL-SCNC: 102 MMOL/L (ref 98–107)
CO2 SERPL-SCNC: 29.3 MMOL/L (ref 22–29)
CREAT SERPL-MCNC: 0.72 MG/DL (ref 0.57–1)
D DIMER PPP FEU-MCNC: 0.98 MCGFEU/ML (ref 0–0.54)
DEPRECATED RDW RBC AUTO: 41.7 FL (ref 37–54)
EGFRCR SERPLBLD CKD-EPI 2021: 99.5 ML/MIN/1.73
EOSINOPHIL # BLD AUTO: 0.09 10*3/MM3 (ref 0–0.4)
EOSINOPHIL NFR BLD AUTO: 1.4 % (ref 0.3–6.2)
ERYTHROCYTE [DISTWIDTH] IN BLOOD BY AUTOMATED COUNT: 12.1 % (ref 12.3–15.4)
GEN 5 2HR TROPONIN T REFLEX: <6 NG/L
GLOBULIN UR ELPH-MCNC: 3 GM/DL
GLUCOSE SERPL-MCNC: 92 MG/DL (ref 65–99)
HCT VFR BLD AUTO: 42.3 % (ref 34–46.6)
HGB BLD-MCNC: 14 G/DL (ref 12–15.9)
HOLD SPECIMEN: NORMAL
HOLD SPECIMEN: NORMAL
IMM GRANULOCYTES # BLD AUTO: 0.01 10*3/MM3 (ref 0–0.05)
IMM GRANULOCYTES NFR BLD AUTO: 0.2 % (ref 0–0.5)
LYMPHOCYTES # BLD AUTO: 1.37 10*3/MM3 (ref 0.7–3.1)
LYMPHOCYTES NFR BLD AUTO: 21.9 % (ref 19.6–45.3)
MCH RBC QN AUTO: 31 PG (ref 26.6–33)
MCHC RBC AUTO-ENTMCNC: 33.1 G/DL (ref 31.5–35.7)
MCV RBC AUTO: 93.8 FL (ref 79–97)
MONOCYTES # BLD AUTO: 0.57 10*3/MM3 (ref 0.1–0.9)
MONOCYTES NFR BLD AUTO: 9.1 % (ref 5–12)
NEUTROPHILS NFR BLD AUTO: 4.17 10*3/MM3 (ref 1.7–7)
NEUTROPHILS NFR BLD AUTO: 66.8 % (ref 42.7–76)
NRBC BLD AUTO-RTO: 0 /100 WBC (ref 0–0.2)
PLATELET # BLD AUTO: 349 10*3/MM3 (ref 140–450)
PMV BLD AUTO: 9.6 FL (ref 6–12)
POTASSIUM SERPL-SCNC: 4.3 MMOL/L (ref 3.5–5.2)
PROT SERPL-MCNC: 7.7 G/DL (ref 6–8.5)
QT INTERVAL: 401 MS
QTC INTERVAL: 408 MS
RBC # BLD AUTO: 4.51 10*6/MM3 (ref 3.77–5.28)
SODIUM SERPL-SCNC: 140 MMOL/L (ref 136–145)
TROPONIN T DELTA: NORMAL
TROPONIN T SERPL HS-MCNC: <6 NG/L
WBC NRBC COR # BLD: 6.25 10*3/MM3 (ref 3.4–10.8)
WHOLE BLOOD HOLD COAG: NORMAL
WHOLE BLOOD HOLD SPECIMEN: NORMAL

## 2023-11-01 PROCEDURE — 99285 EMERGENCY DEPT VISIT HI MDM: CPT

## 2023-11-01 PROCEDURE — 71045 X-RAY EXAM CHEST 1 VIEW: CPT

## 2023-11-01 PROCEDURE — 25510000001 IOPAMIDOL PER 1 ML: Performed by: EMERGENCY MEDICINE

## 2023-11-01 PROCEDURE — 93010 ELECTROCARDIOGRAM REPORT: CPT | Performed by: INTERNAL MEDICINE

## 2023-11-01 PROCEDURE — 84484 ASSAY OF TROPONIN QUANT: CPT | Performed by: EMERGENCY MEDICINE

## 2023-11-01 PROCEDURE — 93005 ELECTROCARDIOGRAM TRACING: CPT | Performed by: EMERGENCY MEDICINE

## 2023-11-01 PROCEDURE — 93005 ELECTROCARDIOGRAM TRACING: CPT

## 2023-11-01 PROCEDURE — 71275 CT ANGIOGRAPHY CHEST: CPT

## 2023-11-01 PROCEDURE — 85379 FIBRIN DEGRADATION QUANT: CPT | Performed by: EMERGENCY MEDICINE

## 2023-11-01 PROCEDURE — 80053 COMPREHEN METABOLIC PANEL: CPT | Performed by: EMERGENCY MEDICINE

## 2023-11-01 PROCEDURE — 36415 COLL VENOUS BLD VENIPUNCTURE: CPT

## 2023-11-01 PROCEDURE — 85025 COMPLETE CBC W/AUTO DIFF WBC: CPT | Performed by: EMERGENCY MEDICINE

## 2023-11-01 RX ORDER — ASPIRIN 325 MG
325 TABLET ORAL ONCE
Status: COMPLETED | OUTPATIENT
Start: 2023-11-01 | End: 2023-11-01

## 2023-11-01 RX ORDER — ASPIRIN 81 MG/1
324 TABLET, CHEWABLE ORAL ONCE
Status: DISCONTINUED | OUTPATIENT
Start: 2023-11-01 | End: 2023-11-01

## 2023-11-01 RX ORDER — SODIUM CHLORIDE 0.9 % (FLUSH) 0.9 %
10 SYRINGE (ML) INJECTION AS NEEDED
Status: DISCONTINUED | OUTPATIENT
Start: 2023-11-01 | End: 2023-11-01 | Stop reason: HOSPADM

## 2023-11-01 RX ADMIN — ASPIRIN 325 MG: 325 TABLET ORAL at 10:06

## 2023-11-01 RX ADMIN — IOPAMIDOL 100 ML: 755 INJECTION, SOLUTION INTRAVENOUS at 11:39

## 2023-11-01 NOTE — ED PROVIDER NOTES
EMERGENCY DEPARTMENT ENCOUNTER    Room Number:  37/37  PCP: Tae Cruz APRN  Patient Care Team:  Tae Cruz APRN as PCP - General (Nurse Practitioner)  Frances Garcia MD as Surgeon (General Surgery)  LamApple Jones MD as Obstetrician (Obstetrics and Gynecology)   Independent Historians: Patient    HPI:  Chief Complaint: Chest pain, high blood pressure    A complete HPI/ROS/PMH/PSH/SH/FH are unobtainable due to: Nothing    Chronic or social conditions impacting patient care (Social Determinants of Health): None  (Financial Resource Strain / Food Insecurity / Transportation Needs / Physical Activity / Stress / Social Connections / Intimate Partner Violence / Housing Stability)    Context: Erick Quiñones is a 52 y.o. female who presents to the ED c/o acute chest pain and high blood pressure.  The patient reports that she developed chest pain morning while at work.  She reports it is in the left anterior chest and radiates straight to her back.  She reports that she felt some symptoms down her left arm as well as some symptoms in her neck.  She denies any shortness of breath or nausea.  She denies any fevers.  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  She reports she takes lisinopril as well as propranolol for her blood pressure.  She states she has been compliant with these medications.  Her symptoms have been constant.  She states she is actually had the pain since yesterday but it seemed to get worse today.  She denies any prior similar episodes.  She denies any history of heart problems.  She denies any prior blood clots.    Review of prior external notes (non-ED) -and- Review of prior external test results outside of this encounter: Cardiology note dated 4/18/2022 with atypical chest pain and a treadmill stress test was ordered.  She was to follow-up in 3 months.  Records from that stress test showed that it was never performed.  No  further cardiology appointments are in our system.    Prescription drug monitoring program review:         PAST MEDICAL HISTORY  Active Ambulatory Problems     Diagnosis Date Noted    Depression 04/26/2018    Asthma     Burning with urination 04/26/2018    Pelvic pain 04/26/2018    Subacute vaginitis 04/26/2018    Klebsiella cystitis 07/29/2019    Encounter for screening colonoscopy 05/13/2020    Abnormal finding on breast imaging 04/03/2023     Resolved Ambulatory Problems     Diagnosis Date Noted    No Resolved Ambulatory Problems     Past Medical History:   Diagnosis Date    Abnormal ECG     Anemia     Anxiety and depression     High blood pressure     PONV (postoperative nausea and vomiting)          PAST SURGICAL HISTORY  Past Surgical History:   Procedure Laterality Date    BREAST AUGMENTATION Bilateral          FAMILY HISTORY  Family History   Problem Relation Age of Onset    Heart attack Mother     Breast cancer Mother     Colon cancer Mother     Melanoma Mother     Hypertension Mother     Hypertension Father     Diabetes type II Father     Diabetes Father     No Known Problems Sister     No Known Problems Maternal Aunt     Skin cancer Maternal Uncle     No Known Problems Paternal Aunt     Skin cancer Maternal Grandmother         face    Ovarian cancer Maternal Grandmother     No Known Problems Maternal Grandfather     No Known Problems Paternal Grandmother     No Known Problems Paternal Grandfather     No Known Problems Son     No Known Problems Son     BRCA 1/2 Neg Hx     Endometrial cancer Neg Hx          SOCIAL HISTORY  Social History     Socioeconomic History    Marital status: Single     Spouse name: MORENO    Number of children: 2   Tobacco Use    Smoking status: Never    Smokeless tobacco: Never   Vaping Use    Vaping Use: Never used   Substance and Sexual Activity    Alcohol use: No     Comment: Caffeine use: 3-4 cups daily    Drug use: No    Sexual activity: Yes     Partners: Male     Birth  control/protection: None     Comment: partner = MORENO         ALLERGIES  Patient has no known allergies.        REVIEW OF SYSTEMS  Review of Systems  Included in HPI  All systems reviewed and negative except for those discussed in HPI.      PHYSICAL EXAM    I have reviewed the triage vital signs and nursing notes.    ED Triage Vitals [11/01/23 0939]   Temp Heart Rate Resp BP SpO2   97.6 °F (36.4 °C) 89 18 -- 99 %      Temp src Heart Rate Source Patient Position BP Location FiO2 (%)   -- -- -- -- --       Physical Exam  GENERAL: Awake, alert, no acute distress  SKIN: Warm, dry  HENT: Normocephalic, atraumatic.  No stridor or drooling  EYES: no scleral icterus  CV: regular rhythm, regular rate  RESPIRATORY: normal effort, lungs clear  ABDOMEN: soft, nontender, nondistended  MUSCULOSKELETAL: no deformity, no calf tenderness or swelling.  Equal upper extremity strength and sensation  NEURO: alert, moves all extremities, follows commands                                                               LAB RESULTS  Recent Results (from the past 24 hour(s))   ECG 12 Lead ED Triage Standing Order; Chest Pain    Collection Time: 11/01/23  9:43 AM   Result Value Ref Range    QT Interval 401 ms    QTC Interval 408 ms   Comprehensive Metabolic Panel    Collection Time: 11/01/23 10:03 AM    Specimen: Blood   Result Value Ref Range    Glucose 92 65 - 99 mg/dL    BUN 10 6 - 20 mg/dL    Creatinine 0.72 0.57 - 1.00 mg/dL    Sodium 140 136 - 145 mmol/L    Potassium 4.3 3.5 - 5.2 mmol/L    Chloride 102 98 - 107 mmol/L    CO2 29.3 (H) 22.0 - 29.0 mmol/L    Calcium 9.9 8.6 - 10.5 mg/dL    Total Protein 7.7 6.0 - 8.5 g/dL    Albumin 4.7 3.5 - 5.2 g/dL    ALT (SGPT) 69 (H) 1 - 33 U/L    AST (SGOT) 107 (H) 1 - 32 U/L    Alkaline Phosphatase 60 39 - 117 U/L    Total Bilirubin 0.4 0.0 - 1.2 mg/dL    Globulin 3.0 gm/dL    A/G Ratio 1.6 g/dL    BUN/Creatinine Ratio 13.9 7.0 - 25.0    Anion Gap 8.7 5.0 - 15.0 mmol/L    eGFR 99.5 >60.0 mL/min/1.73    High Sensitivity Troponin T    Collection Time: 11/01/23 10:03 AM    Specimen: Blood   Result Value Ref Range    HS Troponin T <6 <10 ng/L   Green Top (Gel)    Collection Time: 11/01/23 10:03 AM   Result Value Ref Range    Extra Tube Hold for add-ons.    Lavender Top    Collection Time: 11/01/23 10:03 AM   Result Value Ref Range    Extra Tube hold for add-on    Gold Top - SST    Collection Time: 11/01/23 10:03 AM   Result Value Ref Range    Extra Tube Hold for add-ons.    Light Blue Top    Collection Time: 11/01/23 10:03 AM   Result Value Ref Range    Extra Tube Hold for add-ons.    CBC Auto Differential    Collection Time: 11/01/23 10:03 AM    Specimen: Blood   Result Value Ref Range    WBC 6.25 3.40 - 10.80 10*3/mm3    RBC 4.51 3.77 - 5.28 10*6/mm3    Hemoglobin 14.0 12.0 - 15.9 g/dL    Hematocrit 42.3 34.0 - 46.6 %    MCV 93.8 79.0 - 97.0 fL    MCH 31.0 26.6 - 33.0 pg    MCHC 33.1 31.5 - 35.7 g/dL    RDW 12.1 (L) 12.3 - 15.4 %    RDW-SD 41.7 37.0 - 54.0 fl    MPV 9.6 6.0 - 12.0 fL    Platelets 349 140 - 450 10*3/mm3    Neutrophil % 66.8 42.7 - 76.0 %    Lymphocyte % 21.9 19.6 - 45.3 %    Monocyte % 9.1 5.0 - 12.0 %    Eosinophil % 1.4 0.3 - 6.2 %    Basophil % 0.6 0.0 - 1.5 %    Immature Grans % 0.2 0.0 - 0.5 %    Neutrophils, Absolute 4.17 1.70 - 7.00 10*3/mm3    Lymphocytes, Absolute 1.37 0.70 - 3.10 10*3/mm3    Monocytes, Absolute 0.57 0.10 - 0.90 10*3/mm3    Eosinophils, Absolute 0.09 0.00 - 0.40 10*3/mm3    Basophils, Absolute 0.04 0.00 - 0.20 10*3/mm3    Immature Grans, Absolute 0.01 0.00 - 0.05 10*3/mm3    nRBC 0.0 0.0 - 0.2 /100 WBC   D-dimer, Quantitative    Collection Time: 11/01/23 10:03 AM    Specimen: Blood   Result Value Ref Range    D-Dimer, Quantitative 0.98 (H) 0.00 - 0.54 MCGFEU/mL   High Sensitivity Troponin T 2Hr    Collection Time: 11/01/23 12:42 PM    Specimen: Blood   Result Value Ref Range    HS Troponin T <6 <10 ng/L    Troponin T Delta         Ordered the above labs and independently  reviewed the results.        RADIOLOGY  CT Angiogram Chest    Result Date: 11/1/2023  Examination: CTA of the chest with contrast pulmonary embolus protocol  TECHNIQUE: CTA of the chest with contrast per pulmonary embolus protocol. Radiation dose reduction techniques were utilized, including automated exposure control and exposure modulation based on body size. 3D reconstructions were performed   HISTORY: Shortness of breath  COMPARISON:None available  FINDINGS: The contrast bolus is good. No thrombus is seen in the main, segmental, or visualized subsegmental pulmonary arteries. There is no evidence of right heart strain.  There is biapical pleural-parenchymal scarring. Dependent atelectasis is seen. No consolidation, pleural effusion, or pneumothorax are demonstrated. Emphysematous changes are present. There is a punctate right middle lobe nodule measuring 3 mm on series 5, image 82. Old granulomatous disease is seen. The central airways are patent.  No enlarged supraclavicular, axillary, mediastinal, or hilar lymph nodes are seen. The mediastinal vasculature is normal in caliber. The heart is normal in size and configuration, without pericardial effusion. There are bilateral breast prostheses.  Limited evaluation of the upper abdomen demonstrates old granulomatous disease in the liver.  Bone windows demonstrate degenerative changes, without suspicious osseous lesion seen.      No pulmonary embolus seen. No acute intrathoracic process seen. Incidental findings as above.    This report was finalized on 11/1/2023 11:49 AM by Dr. Yves Vazquez M.D on Workstation: [id number]      XR Chest 1 View    Result Date: 11/1/2023  XR CHEST 1 VW-  HISTORY: 52-year-old female with chest pain and hypertension.  FINDINGS: No evidence for pneumonia, effusions, or CHF.  This report was finalized on 11/1/2023 10:15 AM by Dr. Naomi Dickens M.D on Workstation: [id number]       I ordered the above noted radiological studies. Reviewed  by me and discussed with radiologist.  See dictation for official radiology interpretation.      PROCEDURES    Procedures      MEDICATIONS GIVEN IN ER    Medications   sodium chloride 0.9 % flush 10 mL (has no administration in time range)   aspirin tablet 325 mg (325 mg Oral Given 11/1/23 1006)   iopamidol (ISOVUE-370) 76 % injection 100 mL (100 mL Intravenous Given by Other 11/1/23 1139)         ORDERS PLACED DURING THIS VISIT:  Orders Placed This Encounter   Procedures    XR Chest 1 View    CT Angiogram Chest    [place] Draw    Comprehensive Metabolic Panel    High Sensitivity Troponin T    CBC Auto Differential    D-dimer, Quantitative    High Sensitivity Troponin T 2Hr    Ambulatory Referral to Cardiology    NPO Diet NPO Type: Strict NPO    Undress & Gown    Continuous Pulse Oximetry    Oxygen Therapy- Nasal Cannula; Titrate 1-6 LPM Per SpO2; 90 - 95%    ECG 12 Lead ED Triage Standing Order; Chest Pain    ECG 12 Lead ED Triage Standing Order; Chest Pain    Insert Peripheral IV    CBC & Differential    Green Top (Gel)    Lavender Top    Gold Top - SST    Light Blue Top         PROGRESS, DATA ANALYSIS, CONSULTS, AND MEDICAL DECISION MAKING    All labs have been independently interpreted by me.  All radiology studies have been reviewed by me and discussed with radiologist dictating the report.   EKG's independently viewed and interpreted by me.  Discussion below represents my analysis of pertinent findings related to patient's condition, differential diagnosis, treatment plan and final disposition.    Differential diagnosis includes but is not limited to acute coronary syndrome, acute aortic syndrome, PE, pneumothorax, unstable angina.    ED Course as of 11/01/23 1503   Wed Nov 01, 2023   1025 XR Chest 1 View  My independent interpretation of the chest x-ray [TR]   1028 EKG          EKG time: 943  Rhythm/Rate: Normal sinus, rate 62  P waves and NM: Normal P, normal NM  QRS, axis: Narrow QRS, normal axis  ST and T  waves: No acute    Independently Interpreted by me  No prior EKG available for comparison   [TR]   1036 D-Dimer, Quant(!): 0.98 [TR]   1204 Heart score 2 [TR]   1327 I reviewed work-up and findings with the patient at the bedside.  Answered all questions.  She has normal troponin.  She had an elevated D-dimer but a negative CTA of the chest.  Her blood pressure is 150 currently.  She does have mild elevation of her AST and ALT that is new from 2 years ago but of uncertain clinical significance.  She has no tenderness in her abdomen.  If her second troponin is negative, plan to discharge her home to follow-up with cardiology as an outpatient as well as her primary care doctor. [TR]   1503 Second troponin is also negative.  Plan discharge home. [TR]      ED Course User Index  [TR] Dmitry Maldonado MD       I interpreted the cardiac monitor rhythm and my independent interpretation is: normal sinus rhythm, rate of 80. The patient presents with chest pain and the cardiac monitor was ordered to monitor for arrhythmias and acute change in clinical status.    AS OF 15:03 EDT VITALS:    BP - 148/89  HR - 60  TEMP - 97.6 °F (36.4 °C)  O2 SATS - 100%        DIAGNOSIS  Final diagnoses:   Chest pain, unspecified type   Pulmonary nodule   Transaminitis         DISPOSITION  ED Disposition       ED Disposition   Discharge    Condition   Stable    Comment   --                  Note Disclaimer: At [facility], we believe that sharing information builds trust and better relationships. You are receiving this note because you recently visited [facility]. It is possible you will see health information before a provider has talked with you about it. This kind of information can be easy to misunderstand. To help you fully understand what it means for your health, we urge you to discuss this note with your provider.         Dmitry Maldonado MD  11/01/23 4104

## 2023-11-01 NOTE — Clinical Note
[facility] EMERGENCY DEPARTMENT  [address] [facility] [address]  Phone: [phone]    Erick Quiñones was seen and treated in our emergency department on 11/1/2023.  She may return to work on 11/03/2023.         Thank you for choosing [facility].    Dmitry Maldonado MD

## 2023-11-01 NOTE — DISCHARGE INSTRUCTIONS
The cardiologist office should call you within 2 business days to schedule an appointment to be seen.  You may also call Dr. Mccarty for an appointment instead of waiting.  Follow-up with your primary care doctor for further evaluation.  They will need to recheck your liver function tests as they were mildly elevated today.

## 2023-11-01 NOTE — ED NOTES
Patient to ER via car from home for hypertension and chest pain  Patient reports that she takes blood pressure medication

## 2023-11-29 ENCOUNTER — TELEPHONE (OUTPATIENT)
Dept: OBSTETRICS AND GYNECOLOGY | Age: 53
End: 2023-11-29
Payer: COMMERCIAL

## 2023-11-29 NOTE — TELEPHONE ENCOUNTER
Pt calling stating she is not due to start her menses & has been having heavy bleeding & cramping x's 3 days. Pt states that bleeding was very heavy yesterday & was having to change her pad ever 1(+) hours. Pt states bleeding today has lightened up some but still abnormal for her. Pt no longer saturating a pad every hour & advised if bleeding increases then she needs to be seen in ER for evaluation. Pt scheduled for f/u & US with [facility] on 11/30/23.

## 2023-11-30 ENCOUNTER — OFFICE VISIT (OUTPATIENT)
Dept: OBSTETRICS AND GYNECOLOGY | Age: 53
End: 2023-11-30
Payer: COMMERCIAL

## 2023-11-30 VITALS
WEIGHT: 160.6 LBS | DIASTOLIC BLOOD PRESSURE: 90 MMHG | BODY MASS INDEX: 26.76 KG/M2 | SYSTOLIC BLOOD PRESSURE: 130 MMHG | HEIGHT: 65 IN

## 2023-11-30 DIAGNOSIS — N83.201 CYST OF RIGHT OVARY: ICD-10-CM

## 2023-11-30 DIAGNOSIS — N92.6 IRREGULAR BLEEDING: Primary | ICD-10-CM

## 2023-11-30 PROCEDURE — 99213 OFFICE O/P EST LOW 20 MIN: CPT | Performed by: PHYSICIAN ASSISTANT

## 2023-11-30 NOTE — PROGRESS NOTES
"Subjective     Chief Complaint   Patient presents with    Follow-up     C/o : Patient stated \" Right lower quadrant abdominal swelling, mass and lump is still there since last visit . She went to get a CT @ [facility] about a month ago & they found a ovarian cyst , she has been experiencing heavy bleeding the last 4 days \".        Erick Quiñones is a 53 y.o.  whose LMP is Patient's last menstrual period was 07/10/2023 (exact date). presents for pelvic u/s    She is noting irregular bleeding  Has been heavy for the past 4 days  Had u/s done in  with Dr fletcher for similar issue  Appeared to have a polyp  Biopsy was done that was benign and did not confirm this  They  opted to start bcp but did discuss D&C, polypectomy  Erick did not end up taking the bcp  Preferred to avoid tx    Had a CT done at [place] recently d/t rlq pain  It revealed a cyst  She is here for a rpt u/s today  Is noting right lower quadrant swelling and discomfort      No Additional Complaints Reported    The following portions of the patient's history were reviewed and updated as appropriate:no additional history reviewed, vital signs, allergies, current medications, past medical history, past social history, past surgical history, and problem list      Review of Systems   Genitourinary:positive for abnormal menstrual periods and rlq pain     Objective      /90 (BP Location: Left arm, Patient Position: Sitting, Cuff Size: Adult)   Ht 165.1 cm (65\")   Wt 72.8 kg (160 lb 9.6 oz)   LMP 07/10/2023 (Exact Date)   BMI 26.73 kg/m²     Physical Exam    General:   alert, comfortable, and no distress   Heart: Not performed today   Lungs: Not performed today.   Breast: Not performed today   Neck: Not performed today   Abdomen: Full exam not done, however, pt does have some swelling on the right lower quadrant with possible hernia? Tender to palpation   CVA: Not performed today   Pelvis: Not performed today   Extremities: Not performed " today   Neurologic: Not performed today   Psychiatric: Not performed today       Lab Review   Labs: No data reviewed    Imaging   Ultrasound - Pelvic Vaginal  1.  Uterus: Normal size, with uterine volume of 118 ml, with uterine dimensions 8 x 5 x 4 cm, and Anteverted     2.  Endometrium:  7.6 mm , Echogenic foci suspect for endometrial polyp, and 1 x .2 cm fluid in fundal portion of endo. .4 x .2 cm anechoic area in anterior portion of endometrium     3.  Myometrium: Heterogenous texture      4.  Ovaries             Left: Normal/unremarkable              Right: Mostly unremarkable , Normal small follicles, Simple cyst 1.8 x .5 cm , and with ovarian volume: 10 ml   Assessment & Plan     ASSESSMENT  1. Irregular bleeding    2. Cyst of right ovary          PLAN  1.   Orders Placed This Encounter   Procedures    Ferritin        CBC & Differential       2. U/s reviewed with pt. Suspect polyp. She would like definitive tx. Will schedule with Dr fletcher to discuss D&C polypectomy. Labs ordered today to assess iron levels. Ca 125 ordered as well. Ovarian cyst appears complex but small. She is interested in following it as she has a family h/o ovarian cancer. Has done genetic testing and it was negative    Follow up: 1 month(s)    DAFNE Patterson  11/30/2023

## 2023-12-01 LAB
BASOPHILS # BLD AUTO: 0 X10E3/UL (ref 0–0.2)
BASOPHILS NFR BLD AUTO: 1 %
CANCER AG125 SERPL-ACNC: 12.5 U/ML (ref 0–38.1)
EOSINOPHIL # BLD AUTO: 0.1 X10E3/UL (ref 0–0.4)
EOSINOPHIL NFR BLD AUTO: 2 %
ERYTHROCYTE [DISTWIDTH] IN BLOOD BY AUTOMATED COUNT: 12.6 % (ref 11.7–15.4)
FERRITIN SERPL-MCNC: 27 NG/ML (ref 15–150)
HCT VFR BLD AUTO: 38.9 % (ref 34–46.6)
HGB BLD-MCNC: 13.1 G/DL (ref 11.1–15.9)
IMM GRANULOCYTES # BLD AUTO: 0 X10E3/UL (ref 0–0.1)
IMM GRANULOCYTES NFR BLD AUTO: 0 %
LYMPHOCYTES # BLD AUTO: 1.9 X10E3/UL (ref 0.7–3.1)
LYMPHOCYTES NFR BLD AUTO: 26 %
MCH RBC QN AUTO: 32.5 PG (ref 26.6–33)
MCHC RBC AUTO-ENTMCNC: 33.7 G/DL (ref 31.5–35.7)
MCV RBC AUTO: 97 FL (ref 79–97)
MONOCYTES # BLD AUTO: 0.6 X10E3/UL (ref 0.1–0.9)
MONOCYTES NFR BLD AUTO: 8 %
NEUTROPHILS # BLD AUTO: 4.6 X10E3/UL (ref 1.4–7)
NEUTROPHILS NFR BLD AUTO: 63 %
PLATELET # BLD AUTO: 351 X10E3/UL (ref 150–450)
RBC # BLD AUTO: 4.03 X10E6/UL (ref 3.77–5.28)
WBC # BLD AUTO: 7.2 X10E3/UL (ref 3.4–10.8)

## 2023-12-07 ENCOUNTER — OFFICE VISIT (OUTPATIENT)
Dept: CARDIOLOGY | Facility: CLINIC | Age: 53
End: 2023-12-07
Payer: COMMERCIAL

## 2023-12-07 VITALS
HEART RATE: 87 BPM | BODY MASS INDEX: 26.92 KG/M2 | HEIGHT: 65 IN | DIASTOLIC BLOOD PRESSURE: 80 MMHG | SYSTOLIC BLOOD PRESSURE: 118 MMHG | WEIGHT: 161.6 LBS

## 2023-12-07 DIAGNOSIS — R06.09 EXERTIONAL DYSPNEA: ICD-10-CM

## 2023-12-07 DIAGNOSIS — R07.2 PRECORDIAL PAIN: Primary | ICD-10-CM

## 2023-12-07 PROCEDURE — 99214 OFFICE O/P EST MOD 30 MIN: CPT | Performed by: INTERNAL MEDICINE

## 2023-12-07 RX ORDER — LISINOPRIL 2.5 MG/1
TABLET ORAL DAILY
COMMUNITY

## 2023-12-07 RX ORDER — PROPRANOLOL HYDROCHLORIDE 10 MG/1
10 TABLET ORAL
COMMUNITY
Start: 2023-07-03 | End: 2023-12-30

## 2023-12-07 NOTE — PROGRESS NOTES
[facility]      Patient Name: Erick Quiñones  :1970  Age: 53 y.o.  Encounter Provider:  Kelby Mccarty Jr, MD      Chief Complaint: Follow-up evaluation and management of chest pain      HPI  Erick Quiñones is a 53 y.o. female presents for follow-up evaluation and management of chest pain.     Last clinic visit note: She works out 3-4 times weekly on a stationary bicycle and states that when she pushes too hard she will have a sharp discomfort which is worse with deep inspiration.  Does not happen every time she works out and sometimes it happens while she is sleeping.  She notes some mild increase in dyspnea on exertion when going up the stairs which she feels is new to her.  She states that she has gained a bit of weight over the last year and wants to be started on phentermine but given the constellation of complaints she was sent for cardiology evaluation before starting on appetite suppressants.  She had history of elevated blood pressure but was taken off of antihypertensives.  She takes propranolol as needed for anxiety.  She is being treated for thyroid disease.  She is a lifelong non-smoker who denies alcohol or illicit drug use.  Her mother had a heart attack at age 79.    She did not schedule the stress test that was recommended at last visit as she was concerned about conditions surrounding healthcare regarding the pandemic.  She states that her blood pressure has been spiking and she has increased social stressors.  She is now on propranolol and lisinopril.  There has been a significant progression of chest pain that she describes as sharp with radiation to the back and exacerbated by physical activity.  This is forced her to stop anything more than mild activity.  She denies orthopnea, PND or edema.  No palpitations, dizziness or syncope.  There is certainly a pleuritic component to her chest discomfort.  She was seen in the emergency room on 2023 at which time a  "CTA chest was performed.  No pulmonary embolus or evidence for other intrathoracic pathology.  I have reviewed the images and there was no sign of coronary calcium on this venous phase study.    The following portions of the patient's history were reviewed and updated as appropriate: allergies, current medications, past family history, past medical history, past social history, past surgical history and problem list.      Review of Systems   Constitutional: Negative for chills and fever.   HENT:  Negative for hoarse voice and sore throat.    Eyes:  Negative for double vision and photophobia.   Cardiovascular:  Positive for chest pain and dyspnea on exertion. Negative for leg swelling, near-syncope, orthopnea, palpitations, paroxysmal nocturnal dyspnea and syncope.   Respiratory:  Negative for cough and wheezing.    Skin:  Negative for poor wound healing and rash.   Musculoskeletal:  Negative for arthritis and joint swelling.   Gastrointestinal:  Negative for bloating, abdominal pain, hematemesis and hematochezia.   Neurological:  Negative for dizziness and focal weakness.   Psychiatric/Behavioral:  Negative for depression and suicidal ideas.        OBJECTIVE:   Vital Signs  Vitals:    12/07/23 0833   BP: 118/80   Pulse: 87     Estimated body mass index is 26.89 kg/m² as calculated from the following:    Height as of this encounter: 165.1 cm (65\").    Weight as of this encounter: 73.3 kg (161 lb 9.6 oz).    Vitals reviewed.   Constitutional:       Appearance: Healthy appearance. Not in distress.   Neck:      Vascular: No JVR. JVD normal.   Pulmonary:      Effort: Pulmonary effort is normal.      Breath sounds: Normal breath sounds. No wheezing. No rhonchi. No rales.   Chest:      Chest wall: Not tender to palpatation.   Cardiovascular:      PMI at left midclavicular line. Normal rate. Regular rhythm. Normal S1. Normal S2.       Murmurs: There is no murmur.      No gallop.  No click. No rub.   Pulses:     Intact " distal pulses.   Edema:     Peripheral edema absent.   Abdominal:      General: Bowel sounds are normal.      Palpations: Abdomen is soft.      Tenderness: There is no abdominal tenderness.   Musculoskeletal: Normal range of motion.         General: No tenderness. Skin:     General: Skin is warm and dry.   Neurological:      General: No focal deficit present.      Mental Status: Alert and oriented to person, place and time.         Procedures          ASSESSMENT:     Precordial pain  Exertional dyspnea  Hypothyroidism    PLAN OF CARE:     Precordial pain -more concerning that this is associated with exertional activity but still with some atypical characteristics.  Patient cannot perform at least 5 METS.  Plan for nuclear stress study.  Exertional dyspnea -check echocardiogram.  If cardiac testing is negative which is my suspicion patient will need a pulmonary workup.  No pulmonary pathology on the recent CTA chest.  Hypothyroidism -defer to PCP for ongoing work-up and management    Return to clinic 6 months           Discharge Medications            Accurate as of December 7, 2023  8:56 AM. If you have any questions, ask your nurse or doctor.                Continue These Medications        Instructions Start Date   ferrous sulfate 325 (65 FE) MG EC tablet   325 mg, Oral, Daily      levothyroxine 25 MCG tablet  Commonly known as: SYNTHROID, LEVOTHROID   No dose, route, or frequency recorded.      lisinopril 2.5 MG tablet  Commonly known as: PRINIVIL,ZESTRIL   Oral, Daily      propranolol 10 MG tablet  Commonly known as: INDERAL   10 mg, Oral      vitamin D 1.25 MG (23091 UT) capsule capsule  Commonly known as: ERGOCALCIFEROL   No dose, route, or frequency recorded.               Thank you for allowing me to participate in the care of your patient,      Sincerely,   Kelby Mccarty Jr, MD  [facility]  12/07/23  08:56 EST

## 2023-12-18 ENCOUNTER — OFFICE VISIT (OUTPATIENT)
Dept: SURGERY | Facility: CLINIC | Age: 53
End: 2023-12-18
Payer: COMMERCIAL

## 2023-12-18 ENCOUNTER — PREP FOR SURGERY (OUTPATIENT)
Dept: OTHER | Facility: HOSPITAL | Age: 53
End: 2023-12-18
Payer: COMMERCIAL

## 2023-12-18 VITALS
HEIGHT: 65 IN | BODY MASS INDEX: 27.36 KG/M2 | DIASTOLIC BLOOD PRESSURE: 88 MMHG | SYSTOLIC BLOOD PRESSURE: 126 MMHG | WEIGHT: 164.2 LBS

## 2023-12-18 DIAGNOSIS — K42.9 UMBILICAL HERNIA: ICD-10-CM

## 2023-12-18 DIAGNOSIS — K40.90 RIGHT INGUINAL HERNIA: Primary | ICD-10-CM

## 2023-12-18 DIAGNOSIS — R93.3 ABNORMAL FINDINGS ON DIAGNOSTIC IMAGING OF DIGESTIVE SYSTEM: Primary | ICD-10-CM

## 2023-12-18 PROCEDURE — 99203 OFFICE O/P NEW LOW 30 MIN: CPT | Performed by: SURGERY

## 2023-12-18 RX ORDER — SODIUM CHLORIDE 0.9 % (FLUSH) 0.9 %
3 SYRINGE (ML) INJECTION EVERY 12 HOURS SCHEDULED
OUTPATIENT
Start: 2024-01-19

## 2023-12-18 RX ORDER — OXYCODONE HCL 10 MG/1
10 TABLET, FILM COATED, EXTENDED RELEASE ORAL ONCE
OUTPATIENT
Start: 2024-01-19 | End: 2023-12-18

## 2023-12-18 RX ORDER — ONDANSETRON 2 MG/ML
4 INJECTION INTRAMUSCULAR; INTRAVENOUS EVERY 6 HOURS PRN
OUTPATIENT
Start: 2023-12-18

## 2023-12-18 RX ORDER — CELECOXIB 200 MG/1
200 CAPSULE ORAL ONCE
OUTPATIENT
Start: 2024-01-19 | End: 2023-12-18

## 2023-12-18 RX ORDER — ACETAMINOPHEN 500 MG
1000 TABLET ORAL ONCE
OUTPATIENT
Start: 2024-01-19 | End: 2023-12-18

## 2023-12-18 RX ORDER — SODIUM CHLORIDE 9 MG/ML
40 INJECTION, SOLUTION INTRAVENOUS AS NEEDED
OUTPATIENT
Start: 2024-01-19

## 2023-12-18 RX ORDER — SODIUM CHLORIDE 0.9 % (FLUSH) 0.9 %
10 SYRINGE (ML) INJECTION AS NEEDED
OUTPATIENT
Start: 2024-01-19

## 2023-12-18 RX ORDER — CEFAZOLIN SODIUM 2 G/100ML
2000 INJECTION, SOLUTION INTRAVENOUS ONCE
OUTPATIENT
Start: 2024-01-19 | End: 2023-12-18

## 2023-12-18 NOTE — PROGRESS NOTES
SURGERY  Erick Quiñones   1970 12/18/23    Chief Complaint:  right inguinal hernia and umbilicus    HPI    Ms. Quiñones is a very nice 53 y.o. female who saw me in 2020 but delayed intervention due to COVID.  Her symptoms have worsened as far as pain, worse when getting ready to use the restroom, with minimal pain at the umbilicus.     CT 12/27/2022 at [facility] with small periumbilical hernia without any other abdominal or pelvic findings.  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  Ordered but not completed.     She describes the right groin as having discomfort.  She has not had her thyroid levels checked for some time, the last TSH being a little above normal limits.  I encouraged her to do this.  She is not always taking her synthroid either.  I counseled the potential for weight gain, decreased metabolism.    Past Medical History:   Diagnosis Date    Abnormal ECG     Anemia     Anxiety and depression     Asthma     Disease of thyroid gland     High blood pressure     PONV (postoperative nausea and vomiting)      Past Surgical History:   Procedure Laterality Date    BREAST AUGMENTATION Bilateral      Family History   Problem Relation Age of Onset    Heart attack Mother     Breast cancer Mother     Colon cancer Mother     Melanoma Mother     Hypertension Mother     Stroke Mother     Hypertension Father     Diabetes type II Father     Diabetes Father     Breast cancer Father     No Known Problems Sister     Breast cancer Maternal Aunt     Skin cancer Maternal Uncle     No Known Problems Paternal Aunt     Skin cancer Maternal Grandmother         face    Ovarian cancer Maternal Grandmother     Melanoma Maternal Grandmother     No Known Problems Maternal Grandfather     No Known Problems Paternal Grandmother     No Known Problems Paternal Grandfather     No Known Problems Son     No Known Problems Son     BRCA 1/2  Neg Hx     Endometrial cancer Neg Hx      Social History     Socioeconomic History    Marital status: Single     Spouse name: MORENO    Number of children: 2   Tobacco Use    Smoking status: Never    Smokeless tobacco: Never   Vaping Use    Vaping Use: Never used   Substance and Sexual Activity    Alcohol use: No     Comment: Caffeine use: 3-4 cups daily    Drug use: Never    Sexual activity: Yes     Partners: Male     Birth control/protection: None     Comment: partner = MORENO         Current Outpatient Medications:     ferrous sulfate 325 (65 FE) MG EC tablet, Take 1 tablet by mouth Daily., Disp: , Rfl:     levothyroxine (SYNTHROID, LEVOTHROID) 25 MCG tablet, , Disp: , Rfl:     lisinopril (PRINIVIL,ZESTRIL) 2.5 MG tablet, Take  by mouth Daily., Disp: , Rfl:     propranolol (INDERAL) 10 MG tablet, Take 1 tablet by mouth., Disp: , Rfl:     vitamin D (ERGOCALCIFEROL) 1.25 MG (28847 UT) capsule capsule, , Disp: , Rfl:     No Known Allergies    PHYSICAL EXAM:    LMP 07/10/2023 (Exact Date)   There is no height or weight on file to calculate BMI.  BMI is >= 25 and <30. (Overweight) The following options were offered after discussion;: get her thyroid levels checkes     Constitutional: well developed, well nourished, appears to have excess weight, healthy  ENMT: Hearing intact, neck without masses  CVS: RRR, no murmur  Respiratory: CTA, normal respiratory effort   Gastrointestinal: abdomen soft, nontender, abdominal hernia tiny at umbilicus  Genitourinary: inguinal hernia palpable on the right  Musculoskeletal: gait normal, muscle mass normal  Neurological: awake and alert, seems to have reasonable capacity for understanding for medical decision making  Psychiatric: appears to have reasonable judgement, pleasant    Radiographic/Lab Findings:       Reviewed: hernia repair process    IMPRESSION:  Right inguinal hernia with tiny umbilical hernia  Hypothyroidism  HTN on lisinopril, inderal  Heavy menses on oral iron  Possible  papillary neoplasm with repeat CT 1 year per PCP.    PLAN:  Laparoscopic right inguinal hernia repair and open umbilical hernia repair  See PCP for thyroid check and be compliant with meds.  Follow up on CT of pancreas thru PCP.  Omit lisinopril day of surgery    Frances Garcia MD  08:03 EST      In order to provide a more personal and interactive patient experience as well as improve efficiency, this note was started prior to the office visit, including review of past history and pertinent images, surgeries.

## 2023-12-22 ENCOUNTER — PATIENT ROUNDING (BHMG ONLY) (OUTPATIENT)
Dept: SURGERY | Facility: CLINIC | Age: 53
End: 2023-12-22
Payer: COMMERCIAL

## 2023-12-22 NOTE — PROGRESS NOTES
December 22, 2023    Hello, may I speak with Erick Quiñones?    My name is Micheal      I am  with [facility]  [address]  [place] [address] [phone].    Before we get started may I verify your date of birth? 1970    I am calling to officially welcome you to our practice and ask about your recent visit. Is this a good time to talk? yes    Tell me about your visit with us. What things went well?  Everything was fine.       We're always looking for ways to make our patients' experiences even better. Do you have recommendations on ways we may improve?  no    Overall were you satisfied with your first visit to our practice? yes       I appreciate you taking the time to speak with me today. Is there anything else I can do for you? no      Thank you, and have a great day.

## 2023-12-28 ENCOUNTER — OFFICE VISIT (OUTPATIENT)
Dept: OBSTETRICS AND GYNECOLOGY | Age: 53
End: 2023-12-28
Payer: COMMERCIAL

## 2023-12-28 VITALS
WEIGHT: 162 LBS | SYSTOLIC BLOOD PRESSURE: 108 MMHG | HEIGHT: 65 IN | DIASTOLIC BLOOD PRESSURE: 68 MMHG | BODY MASS INDEX: 26.99 KG/M2

## 2023-12-28 DIAGNOSIS — N84.0 ENDOMETRIAL POLYP: ICD-10-CM

## 2023-12-28 DIAGNOSIS — N92.0 MENORRHAGIA WITH REGULAR CYCLE: ICD-10-CM

## 2023-12-28 DIAGNOSIS — N39.3 SUI (STRESS URINARY INCONTINENCE, FEMALE): Primary | ICD-10-CM

## 2023-12-28 PROCEDURE — 99213 OFFICE O/P EST LOW 20 MIN: CPT | Performed by: OBSTETRICS & GYNECOLOGY

## 2023-12-28 RX ORDER — SODIUM CHLORIDE 9 MG/ML
40 INJECTION, SOLUTION INTRAVENOUS AS NEEDED
OUTPATIENT
Start: 2023-12-28

## 2023-12-28 RX ORDER — GABAPENTIN 100 MG/1
600 CAPSULE ORAL ONCE
OUTPATIENT
Start: 2023-12-28 | End: 2023-12-28

## 2023-12-28 RX ORDER — SCOLOPAMINE TRANSDERMAL SYSTEM 1 MG/1
1 PATCH, EXTENDED RELEASE TRANSDERMAL CONTINUOUS
OUTPATIENT
Start: 2023-12-28 | End: 2023-12-31

## 2023-12-28 RX ORDER — SODIUM CHLORIDE 0.9 % (FLUSH) 0.9 %
10 SYRINGE (ML) INJECTION AS NEEDED
OUTPATIENT
Start: 2023-12-28

## 2023-12-28 RX ORDER — SODIUM CHLORIDE 0.9 % (FLUSH) 0.9 %
10 SYRINGE (ML) INJECTION EVERY 12 HOURS SCHEDULED
OUTPATIENT
Start: 2023-12-28

## 2023-12-28 RX ORDER — ACETAMINOPHEN 500 MG
1000 TABLET ORAL ONCE
OUTPATIENT
Start: 2023-12-28 | End: 2023-12-28

## 2023-12-28 NOTE — PROGRESS NOTES
GYN Visit    2023    Patient: Erick Quiñones          MR#:[id number]      Chief Complaint   Patient presents with    Follow-up     Gyn F/u - Pt seen Lissett in office on 23 for heavy bleeding & abdominal swelling, Discuss possible D&C polypectomy         History of Present Illness    53 y.o. female  who presents for problem visit    The patient has had some heavy irregular bleeding  She had a endometrial biopsy about 6 months ago which was negative for hyperplasia  She had an ultrasound which showed a  possible polyp, endometrium was not thickened  Otherwise the ultrasound was normal  We discussed doing a 1 day surgery to remove the endometrial polyp and also do an ablation why we are there  Patient has no hot flashes or night sweats  We do not think she is menopausal    Patient also brought up a second problem of stress urinary incontinence  This is really bothersome to her and she desires to have a surgery to correct this  She has seen Dr. Villagomez in the past but she did miss a couple appointments during COVID and was discharged from the practice  I placed another referral order today and she will make an appointment with [facility]  She declines pelvic floor therapy, she has tried [facility] in the past    Of note patient has a hernia repair by Dr. Garcia coming up in January  As we discussed we will probably schedule her surgery mid February        Patient's last menstrual period was 2023 (exact date).    ________________________________________  Patient Active Problem List   Diagnosis    Depression    Asthma    Burning with urination    Pelvic pain    Subacute vaginitis    Klebsiella cystitis    Encounter for screening colonoscopy    Abnormal finding on breast imaging    abnormal pancreatic imaging, possible side branch IPMN    Right inguinal hernia    Umbilical hernia       Past Medical History:   Diagnosis Date    Abnormal ECG     Anemia     Anxiety and depression     Asthma      "Disease of thyroid gland     High blood pressure     PONV (postoperative nausea and vomiting)        Past Surgical History:   Procedure Laterality Date    BREAST AUGMENTATION Bilateral     ENDOSCOPY AND COLONOSCOPY N/A 04/2022       Social History     Tobacco Use   Smoking Status Never   Smokeless Tobacco Never       has a current medication list which includes the following prescription(s): ferrous sulfate, levothyroxine, lisinopril, propranolol, and vitamin d.  ________________________________________    Current contraception: none      The following portions of the patient's history were reviewed and updated as appropriate: allergies, current medications, past family history, past medical history, past social history, past surgical history, and problem list.    Review of Systems    Pertinent items are noted in HPI.     Objective   Physical Exam    /[birth year]   Ht 165.1 cm (65\")   Wt 73.5 kg (162 lb)   LMP 12/19/2023 (Exact Date)   BMI 26.96 kg/m²    BP Readings from Last 3 Encounters:   12/28/23 108/68   12/18/23 126/88   12/07/23 118/80      Wt Readings from Last 3 Encounters:   12/28/23 73.5 kg (162 lb)   12/18/23 74.5 kg (164 lb 3.2 oz)   12/07/23 73.3 kg (161 lb 9.6 oz)      BMI: Estimated body mass index is 26.96 kg/m² as calculated from the following:    Height as of this encounter: 165.1 cm (65\").    Weight as of this encounter: 73.5 kg (162 lb).    Lungs: non labored breathing, no wheezing or tachpnea  Extremities: extremities normal, atraumatic, no cyanosis or edema  Skin: Skin color, texture, turgor normal. No rashes or lesions  Neurologic: Grossly normal  General:   alert, appears stated age, and cooperative                Reviewed US done this month                 Assessment:      Diagnoses and all orders for this visit:    1. TOMMY (stress urinary incontinence, female) (Primary)  -     Ambulatory Referral to Gynecologic Urology    2. Menorrhagia with regular cycle  -     sodium chloride 0.9 % flush " 10 mL  -     sodium chloride 0.9 % flush 10 mL  -     sodium chloride 0.9 % infusion 40 mL  -     Case Request; Standing  -     ceFAZolin (ANCEF) 2,000 mg in sodium chloride 0.9 % 100 mL IVPB  -     acetaminophen (TYLENOL) tablet 1,000 mg  -     gabapentin (NEURONTIN) capsule 600 mg  -     scopolamine patch 1 mg/72 hr  -     Case Request    3. Endometrial polyp  -     sodium chloride 0.9 % flush 10 mL  -     sodium chloride 0.9 % flush 10 mL  -     sodium chloride 0.9 % infusion 40 mL  -     Case Request; Standing  -     ceFAZolin (ANCEF) 2,000 mg in sodium chloride 0.9 % 100 mL IVPB  -     acetaminophen (TYLENOL) tablet 1,000 mg  -     gabapentin (NEURONTIN) capsule 600 mg  -     scopolamine patch 1 mg/72 hr  -     Case Request    Other orders  -     Code Status and Medical Interventions:; Standing  -     Follow Anesthesia Guidelines / Protocol; Future  -     Follow Anesthesia Guidelines / Protocol; Standing  -     Chlorhexidine Skin Prep; Future  -     Obtain Informed Consent; Standing  -     Verify / Perform Chlorhexidine Skin Prep; Standing  -     Insert Peripheral IV; Standing  -     Saline Lock & Maintain IV Access; Standing      Recommend dilation and curettage, MyoSure removal of endometrial polyp, and NovaSure endometrial ablation.  Risk benefits alternatives were discussed  Referral to urogyn for possible treatment of stress urinary incontinence

## 2023-12-29 ENCOUNTER — TELEPHONE (OUTPATIENT)
Dept: CARDIOLOGY | Facility: CLINIC | Age: 53
End: 2023-12-29
Payer: COMMERCIAL

## 2023-12-29 NOTE — TELEPHONE ENCOUNTER
LVM with patient telling them the details of their Nuclear test.  Do not eat, drink, smoke (this includes e-cigarettes), chew tobacco or gum 4 hours prior to the test.  No caffeine or chocolate 24 hours prior to your test. This includes coffee, tea, soda, all decaffeinated beverages, cappuccino flavorings or any energy drinks. Also, told them to look at the list of medications on [facility] or the list we gave them in office to make sure they do not have to hold anything for 24 hours.

## 2024-01-02 ENCOUNTER — HOSPITAL ENCOUNTER (OUTPATIENT)
Dept: CARDIOLOGY | Facility: HOSPITAL | Age: 54
Discharge: HOME | End: 2024-01-02
Payer: COMMERCIAL

## 2024-01-02 VITALS
WEIGHT: 162 LBS | HEART RATE: 78 BPM | SYSTOLIC BLOOD PRESSURE: 110 MMHG | BODY MASS INDEX: 26.99 KG/M2 | HEIGHT: 65 IN | DIASTOLIC BLOOD PRESSURE: 68 MMHG

## 2024-01-02 DIAGNOSIS — R06.09 EXERTIONAL DYSPNEA: Primary | ICD-10-CM

## 2024-01-02 DIAGNOSIS — R06.09 EXERTIONAL DYSPNEA: ICD-10-CM

## 2024-01-02 DIAGNOSIS — R07.2 PRECORDIAL PAIN: ICD-10-CM

## 2024-01-02 LAB
AORTIC ARCH: 2.4 CM
ASCENDING AORTA: 3 CM
BH CV ECHO MEAS - ACS: 1.82 CM
BH CV ECHO MEAS - AO MAX PG: 5.7 MMHG
BH CV ECHO MEAS - AO MEAN PG: 3 MMHG
BH CV ECHO MEAS - AO ROOT DIAM: 2.8 CM
BH CV ECHO MEAS - AO V2 MAX: 119 CM/SEC
BH CV ECHO MEAS - AO V2 VTI: 25.6 CM
BH CV ECHO MEAS - AVA(I,D): 2.15 CM2
BH CV ECHO MEAS - EDV(CUBED): 68.9 ML
BH CV ECHO MEAS - EDV(MOD-SP2): 61 ML
BH CV ECHO MEAS - EDV(MOD-SP4): 76 ML
BH CV ECHO MEAS - EF(MOD-BP): 64.9 %
BH CV ECHO MEAS - EF(MOD-SP2): 60.7 %
BH CV ECHO MEAS - EF(MOD-SP4): 68.4 %
BH CV ECHO MEAS - ESV(CUBED): 20.7 ML
BH CV ECHO MEAS - ESV(MOD-SP2): 24 ML
BH CV ECHO MEAS - ESV(MOD-SP4): 24 ML
BH CV ECHO MEAS - FS: 33 %
BH CV ECHO MEAS - IVS/LVPW: 1.11 CM
BH CV ECHO MEAS - IVSD: 1 CM
BH CV ECHO MEAS - LAT PEAK E' VEL: 8.4 CM/SEC
BH CV ECHO MEAS - LV DIASTOLIC VOL/BSA (35-75): 42 CM2
BH CV ECHO MEAS - LV MASS(C)D: 123 GRAMS
BH CV ECHO MEAS - LV MAX PG: 3.6 MMHG
BH CV ECHO MEAS - LV MEAN PG: 2 MMHG
BH CV ECHO MEAS - LV SYSTOLIC VOL/BSA (12-30): 13.3 CM2
BH CV ECHO MEAS - LV V1 MAX: 94.6 CM/SEC
BH CV ECHO MEAS - LV V1 VTI: 19 CM
BH CV ECHO MEAS - LVIDD: 4.1 CM
BH CV ECHO MEAS - LVIDS: 2.7 CM
BH CV ECHO MEAS - LVOT AREA: 2.9 CM2
BH CV ECHO MEAS - LVOT DIAM: 1.92 CM
BH CV ECHO MEAS - LVPWD: 0.9 CM
BH CV ECHO MEAS - MED PEAK E' VEL: 8.1 CM/SEC
BH CV ECHO MEAS - MV A DUR: 0.12 SEC
BH CV ECHO MEAS - MV A MAX VEL: 79.3 CM/SEC
BH CV ECHO MEAS - MV DEC SLOPE: 345.8 CM/SEC2
BH CV ECHO MEAS - MV DEC TIME: 0.2 SEC
BH CV ECHO MEAS - MV E MAX VEL: 70.3 CM/SEC
BH CV ECHO MEAS - MV E/A: 0.89
BH CV ECHO MEAS - MV MAX PG: 3.9 MMHG
BH CV ECHO MEAS - MV MEAN PG: 1.58 MMHG
BH CV ECHO MEAS - MV P1/2T: 86.2 MSEC
BH CV ECHO MEAS - MV V2 VTI: 28.6 CM
BH CV ECHO MEAS - MVA(P1/2T): 2.6 CM2
BH CV ECHO MEAS - MVA(VTI): 1.92 CM2
BH CV ECHO MEAS - PA ACC TIME: 0.11 SEC
BH CV ECHO MEAS - PA V2 MAX: 90.9 CM/SEC
BH CV ECHO MEAS - PULM A REVS DUR: 0.11 SEC
BH CV ECHO MEAS - PULM A REVS VEL: 22.7 CM/SEC
BH CV ECHO MEAS - PULM DIAS VEL: 45.8 CM/SEC
BH CV ECHO MEAS - PULM S/D: 1.08
BH CV ECHO MEAS - PULM SYS VEL: 49.7 CM/SEC
BH CV ECHO MEAS - QP/QS: 0.7
BH CV ECHO MEAS - RAP SYSTOLE: 8 MMHG
BH CV ECHO MEAS - RV MAX PG: 2.33 MMHG
BH CV ECHO MEAS - RV V1 MAX: 76.3 CM/SEC
BH CV ECHO MEAS - RV V1 VTI: 14.6 CM
BH CV ECHO MEAS - RVOT DIAM: 1.82 CM
BH CV ECHO MEAS - RVSP: 20.6 MMHG
BH CV ECHO MEAS - SI(MOD-SP2): 20.5 ML/M2
BH CV ECHO MEAS - SI(MOD-SP4): 28.8 ML/M2
BH CV ECHO MEAS - SUP REN AO DIAM: 2.1 CM
BH CV ECHO MEAS - SV(LVOT): 55 ML
BH CV ECHO MEAS - SV(MOD-SP2): 37 ML
BH CV ECHO MEAS - SV(MOD-SP4): 52 ML
BH CV ECHO MEAS - SV(RVOT): 38.3 ML
BH CV ECHO MEAS - TAPSE (>1.6): 1.79 CM
BH CV ECHO MEAS - TR MAX PG: 12.6 MMHG
BH CV ECHO MEAS - TR MAX VEL: 177.8 CM/SEC
BH CV ECHO MEASUREMENTS AVERAGE E/E' RATIO: 8.52
BH CV NUCLEAR PRIOR STUDY: 2
BH CV REST NUCLEAR ISOTOPE DOSE: 11.5 MCI
BH CV STRESS BP STAGE 1: NORMAL
BH CV STRESS BP STAGE 2: NORMAL
BH CV STRESS BP STAGE 3: NORMAL
BH CV STRESS DURATION MIN STAGE 1: 3
BH CV STRESS DURATION MIN STAGE 2: 3
BH CV STRESS DURATION MIN STAGE 3: 3
BH CV STRESS DURATION SEC STAGE 1: 0
BH CV STRESS DURATION SEC STAGE 2: 0
BH CV STRESS DURATION SEC STAGE 3: 0
BH CV STRESS GRADE STAGE 1: 10
BH CV STRESS GRADE STAGE 2: 12
BH CV STRESS GRADE STAGE 3: 14
BH CV STRESS HR STAGE 1: 117
BH CV STRESS HR STAGE 2: 146
BH CV STRESS HR STAGE 3: 158
BH CV STRESS METS STAGE 1: 5
BH CV STRESS METS STAGE 2: 7.5
BH CV STRESS METS STAGE 3: 10
BH CV STRESS NUCLEAR ISOTOPE DOSE: 33.8 MCI
BH CV STRESS PROTOCOL 1: NORMAL
BH CV STRESS RECOVERY BP: NORMAL MMHG
BH CV STRESS RECOVERY HR: 101 BPM
BH CV STRESS SPEED STAGE 1: 1.7
BH CV STRESS SPEED STAGE 2: 2.5
BH CV STRESS SPEED STAGE 3: 3.4
BH CV STRESS STAGE 1: 1
BH CV STRESS STAGE 2: 2
BH CV STRESS STAGE 3: 3
BH CV XLRA - RV BASE: 2.17 CM
BH CV XLRA - RV LENGTH: 7.1 CM
BH CV XLRA - RV MID: 2.31 CM
BH CV XLRA - TDI S': 13.3 CM/SEC
LEFT ATRIUM VOLUME INDEX: 12.8 ML/M2
LV EF NUC BP: 67 %
MAXIMAL PREDICTED HEART RATE: 165 BPM
PERCENT MAX PREDICTED HR: 95.76 %
SINUS: 2.8 CM
STJ: 2.31 CM
STRESS BASELINE BP: NORMAL MMHG
STRESS BASELINE HR: 71 BPM
STRESS PERCENT HR: 113 %
STRESS POST ESTIMATED WORKLOAD: 10 METS
STRESS POST EXERCISE DUR MIN: 9 MIN
STRESS POST EXERCISE DUR SEC: 0 SEC
STRESS POST PEAK BP: NORMAL MMHG
STRESS POST PEAK HR: 158 BPM
STRESS TARGET HR: 140 BPM

## 2024-01-02 PROCEDURE — A9502 TC99M TETROFOSMIN: HCPCS | Performed by: INTERNAL MEDICINE

## 2024-01-02 PROCEDURE — 78452 HT MUSCLE IMAGE SPECT MULT: CPT

## 2024-01-02 PROCEDURE — 93306 TTE W/DOPPLER COMPLETE: CPT

## 2024-01-02 PROCEDURE — 93017 CV STRESS TEST TRACING ONLY: CPT

## 2024-01-02 PROCEDURE — 0 TECHNETIUM TETROFOSMIN KIT: Performed by: INTERNAL MEDICINE

## 2024-01-02 RX ADMIN — TETROFOSMIN 1 DOSE: 1.38 INJECTION, POWDER, LYOPHILIZED, FOR SOLUTION INTRAVENOUS at 09:24

## 2024-01-02 RX ADMIN — TETROFOSMIN 1 DOSE: 1.38 INJECTION, POWDER, LYOPHILIZED, FOR SOLUTION INTRAVENOUS at 10:15

## 2024-01-02 NOTE — PROGRESS NOTES
Please let the patient know that stress study and echo are normal.  I will be placing a referral to pulmonology to evaluate how much the lungs may be contributing to current clinical complaints.

## 2024-01-03 ENCOUNTER — TELEPHONE (OUTPATIENT)
Dept: CARDIOLOGY | Facility: HOSPITAL | Age: 54
End: 2024-01-03
Payer: COMMERCIAL

## 2024-01-03 NOTE — TELEPHONE ENCOUNTER
----- Message from Kelby Mccarty Jr., MD sent at 1/2/2024  4:10 PM EST -----  Please let the patient know that stress study and echo are normal.  I will be placing a referral to pulmonology to evaluate how much the lungs may be contributing to current clinical complaints.

## 2024-01-03 NOTE — TELEPHONE ENCOUNTER
Results and recommendations reviewed with the patient. Patient verbalized understanding. Denied further questions at this time.    Susi Li RN  Triage [facility]

## 2024-01-05 ENCOUNTER — PRE-ADMISSION TESTING (OUTPATIENT)
Dept: PREADMISSION TESTING | Facility: HOSPITAL | Age: 54
End: 2024-01-05
Payer: COMMERCIAL

## 2024-01-05 VITALS
BODY MASS INDEX: 25.49 KG/M2 | DIASTOLIC BLOOD PRESSURE: 79 MMHG | WEIGHT: 158.6 LBS | HEART RATE: 77 BPM | OXYGEN SATURATION: 100 % | HEIGHT: 66 IN | TEMPERATURE: 98.2 F | SYSTOLIC BLOOD PRESSURE: 119 MMHG | RESPIRATION RATE: 16 BRPM

## 2024-01-05 LAB
ANION GAP SERPL CALCULATED.3IONS-SCNC: 9.4 MMOL/L (ref 5–15)
BUN SERPL-MCNC: 10 MG/DL (ref 6–20)
BUN/CREAT SERPL: 14.5 (ref 7–25)
CALCIUM SPEC-SCNC: 9.2 MG/DL (ref 8.6–10.5)
CHLORIDE SERPL-SCNC: 106 MMOL/L (ref 98–107)
CO2 SERPL-SCNC: 25.6 MMOL/L (ref 22–29)
CREAT SERPL-MCNC: 0.69 MG/DL (ref 0.57–1)
EGFRCR SERPLBLD CKD-EPI 2021: 102.6 ML/MIN/1.73
GLUCOSE SERPL-MCNC: 85 MG/DL (ref 65–99)
POTASSIUM SERPL-SCNC: 4 MMOL/L (ref 3.5–5.2)
SODIUM SERPL-SCNC: 141 MMOL/L (ref 136–145)

## 2024-01-05 PROCEDURE — 80048 BASIC METABOLIC PNL TOTAL CA: CPT

## 2024-01-05 PROCEDURE — 36415 COLL VENOUS BLD VENIPUNCTURE: CPT

## 2024-01-05 RX ORDER — LEVOTHYROXINE SODIUM 0.05 MG/1
50 TABLET ORAL DAILY
COMMUNITY

## 2024-01-05 RX ORDER — PROPRANOLOL HYDROCHLORIDE 10 MG/1
10 TABLET ORAL 2 TIMES DAILY
COMMUNITY
Start: 2024-01-03 | End: 2025-01-02

## 2024-01-05 RX ORDER — LEVOCETIRIZINE DIHYDROCHLORIDE 5 MG/1
5 TABLET, FILM COATED ORAL EVERY EVENING
COMMUNITY

## 2024-01-05 RX ORDER — HYDROXYZINE HYDROCHLORIDE 25 MG/1
25 TABLET, FILM COATED ORAL AS NEEDED
COMMUNITY
Start: 2024-01-03 | End: 2025-01-02

## 2024-01-05 NOTE — DISCHARGE INSTRUCTIONS
CHLORHEXIDINE CLOTH INSTRUCTIONS  The morning of surgery follow these instructions using the Chlorhexidine cloths you've been given.  These steps reduce bacteria on the body.  Do not use the cloths near your eyes, ears mouth, genitalia or on open wounds.  Throw the cloths away after use but do not try to flush them down a toilet.      Open and remove one cloth at a time from the package.    Leave the cloth unfolded and begin the bathing.  Massage the skin with the cloths using gentle pressure to remove bacteria.  Do not scrub harshly.   Follow the steps below with one 2% CHG cloth per area (6 total cloths).  One cloth for neck, shoulders and chest.  One cloth for both arms, hands, fingers and underarms (do underarms last).  One cloth for the abdomen followed by groin.  One cloth for right leg and foot including between the toes.  One cloth for left leg and foot including between the toes.  The last cloth is to be used for the back of the neck, back and buttocks.    Allow the CHG to air dry 3 minutes on the skin which will give it time to work and decrease the chance of irritation.  The skin may feel sticky until it is dry.  Do not rinse with water or any other liquid or you will lose the beneficial effects of the CHG.  If mild skin irritation occurs, do rinse the skin to remove the CHG.  Report this to the nurse at time of admission.  Do not apply lotions, creams, ointments, deodorants or perfumes after using the clothes. Dress in clean clothes before coming to the hospital.    Take the following medications the morning of surgery:  PROPRANOLOL, SERTRALINE, AND LEVOTHYROXINE    ARRIVAL TIME FOR 0600 ON 1/19/24    If you are on prescription narcotic pain medication to control your pain you may also take that medication the morning of surgery.    General Instructions:  Do not eat solid food after midnight the night before surgery.  You may drink clear liquids day of surgery but must stop at least one hour before your  hospital arrival time.  It is beneficial for you to have a clear drink that contains carbohydrates the day of surgery.  We suggest a 12 to 20 ounce bottle of Gatorade or Powerade for non-diabetic patients or a 12 to 20 ounce bottle of G2 or Powerade Zero for diabetic patients. (Pediatric patients, are not advised to drink a 12 to 20 ounce carbohydrate drink)    Clear liquids are liquids you can see through.  Nothing red in color.     Plain water                               Sports drinks  Sodas                                   Gelatin (Jell-O)  Fruit juices without pulp such as white grape juice and apple juice  Popsicles that contain no fruit or yogurt  Tea or coffee (no cream or milk added)  Gatorade / Powerade  G2 / Powerade Zero    Infants may have breast milk up to four hours before surgery.  Infants drinking formula may drink formula up to six hours before surgery.   Patients who avoid smoking, chewing tobacco and alcohol for 4 weeks prior to surgery have a reduced risk of post-operative complications.  Quit smoking as many days before surgery as you can.  Do not smoke, use chewing tobacco or drink alcohol the day of surgery.   If applicable bring your C-PAP/ BI-PAP machine in with you to preop day of surgery.  Bring any papers given to you in the doctor’s office.  Wear clean comfortable clothes.  Do not wear contact lenses, false eyelashes or make-up.  Bring a case for your glasses.   Bring crutches or walker if applicable.  Remove all piercings.  Leave jewelry and any other valuables at home.  Hair extensions with metal clips must be removed prior to surgery.  The Pre-Admission Testing nurse will instruct you to bring medications if unable to obtain an accurate list in Pre-Admission Testing.            Preventing a Surgical Site Infection:  For 2 to 3 days before surgery, avoid shaving with a razor because the razor can irritate skin and make it easier to develop an infection.    Any areas of open skin can  increase the risk of a post-operative wound infection by allowing bacteria to enter and travel throughout the body.  Notify your surgeon if you have any skin wounds / rashes even if it is not near the expected surgical site.  The area will need assessed to determine if surgery should be delayed until it is healed.  The night prior to surgery shower using a fresh bar of anti-bacterial soap (such as Dial) and clean washcloth.  Sleep in a clean bed with clean clothing.  Do not allow pets to sleep with you.  Shower on the morning of surgery using a fresh bar of anti-bacterial soap (such as Dial) and clean washcloth.  Dry with a clean towel and dress in clean clothing.  Ask your surgeon if you will be receiving antibiotics prior to surgery.  Make sure you, your family, and all healthcare providers clean their hands with soap and water or an alcohol based hand  before caring for you or your wound.    Day of surgery:  Your arrival time is approximately two hours before your scheduled surgery time.  Upon arrival, a Pre-op nurse and Anesthesiologist will review your health history, obtain vital signs, and answer questions you may have.  The only belongings needed at this time will be a list of your home medications and if applicable your C-PAP/BI-PAP machine.  A Pre-op nurse will start an IV and you may receive medication in preparation for surgery, including something to help you relax.     Please be aware that surgery does come with discomfort.  We want to make every effort to control your discomfort so please discuss any uncontrolled symptoms with your nurse.   Your doctor will most likely have prescribed pain medications.      If you are going home after surgery you will receive individualized written care instructions before being discharged.  A responsible adult must drive you to and from the hospital on the day of your surgery and stay with you for 24 hours.  Discharge prescriptions can be filled by the  hospital pharmacy during regular pharmacy hours.  If you are having surgery late in the day/evening your prescription may be e-prescribed to your pharmacy.  Please verify your pharmacy hours or chose a 24 hour pharmacy to avoid not having access to your prescription because your pharmacy has closed for the day.    If you are staying overnight following surgery, you will be transported to your hospital room following the recovery period.  [facility] has all private rooms.    If you have any questions please call Pre-Admission Testing at [phone].  Deductibles and co-payments are collected on the day of service. Please be prepared to pay the required co-pay, deductible or deposit on the day of service as defined by your plan.    Call your surgeon immediately if you experience any of the following symptoms:  Sore Throat  Shortness of Breath or difficulty breathing  Cough  Chills  Body soreness or muscle pain  Headache  Fever  New loss of taste or smell  Do not arrive for your surgery ill.  Your procedure will need to be rescheduled to another time.  You will need to call your physician before the day of surgery to avoid any unnecessary exposure to hospital staff as well as other patients.

## 2024-01-17 ENCOUNTER — TELEPHONE (OUTPATIENT)
Dept: SURGERY | Facility: CLINIC | Age: 54
End: 2024-01-17
Payer: COMMERCIAL

## 2024-01-19 ENCOUNTER — ANESTHESIA EVENT (OUTPATIENT)
Dept: PERIOP | Facility: HOSPITAL | Age: 54
End: 2024-01-19
Payer: COMMERCIAL

## 2024-01-19 ENCOUNTER — HOSPITAL ENCOUNTER (OUTPATIENT)
Facility: HOSPITAL | Age: 54
Setting detail: HOSPITAL OUTPATIENT SURGERY
Discharge: HOME | End: 2024-01-19
Attending: SURGERY | Admitting: SURGERY
Payer: COMMERCIAL

## 2024-01-19 ENCOUNTER — ANESTHESIA (OUTPATIENT)
Dept: PERIOP | Facility: HOSPITAL | Age: 54
End: 2024-01-19
Payer: COMMERCIAL

## 2024-01-19 VITALS
TEMPERATURE: 97.7 F | HEART RATE: 54 BPM | RESPIRATION RATE: 16 BRPM | SYSTOLIC BLOOD PRESSURE: 165 MMHG | WEIGHT: 166.01 LBS | OXYGEN SATURATION: 100 % | BODY MASS INDEX: 26.79 KG/M2 | DIASTOLIC BLOOD PRESSURE: 83 MMHG

## 2024-01-19 DIAGNOSIS — K42.9 UMBILICAL HERNIA: ICD-10-CM

## 2024-01-19 DIAGNOSIS — K40.90 RIGHT INGUINAL HERNIA: ICD-10-CM

## 2024-01-19 LAB
B-HCG UR QL: NEGATIVE
EXPIRATION DATE: NORMAL
INTERNAL NEGATIVE CONTROL: NEGATIVE
INTERNAL POSITIVE CONTROL: POSITIVE
Lab: NORMAL

## 2024-01-19 PROCEDURE — 49650 LAP ING HERNIA REPAIR INIT: CPT | Performed by: SURGERY

## 2024-01-19 PROCEDURE — C1781 MESH (IMPLANTABLE): HCPCS | Performed by: SURGERY

## 2024-01-19 PROCEDURE — 25810000003 LACTATED RINGERS PER 1000 ML: Performed by: STUDENT IN AN ORGANIZED HEALTH CARE EDUCATION/TRAINING PROGRAM

## 2024-01-19 PROCEDURE — 25010000002 ONDANSETRON PER 1 MG: Performed by: SURGERY

## 2024-01-19 PROCEDURE — 25010000002 FENTANYL CITRATE (PF) 50 MCG/ML SOLUTION: Performed by: NURSE ANESTHETIST, CERTIFIED REGISTERED

## 2024-01-19 PROCEDURE — 25010000002 GLYCOPYRROLATE 1 MG/5ML SOLUTION: Performed by: NURSE ANESTHETIST, CERTIFIED REGISTERED

## 2024-01-19 PROCEDURE — 49650 LAP ING HERNIA REPAIR INIT: CPT | Performed by: SPECIALIST/TECHNOLOGIST, OTHER

## 2024-01-19 PROCEDURE — 25010000002 DEXAMETHASONE SODIUM PHOSPHATE 20 MG/5ML SOLUTION: Performed by: NURSE ANESTHETIST, CERTIFIED REGISTERED

## 2024-01-19 PROCEDURE — 25010000002 SUGAMMADEX 200 MG/2ML SOLUTION: Performed by: NURSE ANESTHETIST, CERTIFIED REGISTERED

## 2024-01-19 PROCEDURE — 25010000002 PROPOFOL 200 MG/20ML EMULSION: Performed by: NURSE ANESTHETIST, CERTIFIED REGISTERED

## 2024-01-19 PROCEDURE — 25010000002 MIDAZOLAM PER 1 MG: Performed by: STUDENT IN AN ORGANIZED HEALTH CARE EDUCATION/TRAINING PROGRAM

## 2024-01-19 PROCEDURE — 81025 URINE PREGNANCY TEST: CPT | Performed by: STUDENT IN AN ORGANIZED HEALTH CARE EDUCATION/TRAINING PROGRAM

## 2024-01-19 PROCEDURE — 25810000003 SODIUM CHLORIDE PER 500 ML: Performed by: SURGERY

## 2024-01-19 PROCEDURE — 25010000002 ONDANSETRON PER 1 MG: Performed by: NURSE ANESTHETIST, CERTIFIED REGISTERED

## 2024-01-19 PROCEDURE — 25010000002 KETOROLAC TROMETHAMINE PER 15 MG: Performed by: NURSE ANESTHETIST, CERTIFIED REGISTERED

## 2024-01-19 PROCEDURE — 25010000002 CEFAZOLIN IN DEXTROSE 2000 MG/ 100 ML SOLUTION: Performed by: SURGERY

## 2024-01-19 DEVICE — MEDIUM-LARGE LIGATION CLIPS 6 CLIPS/CART
Type: IMPLANTABLE DEVICE | Site: ABDOMEN | Status: FUNCTIONAL
Brand: VAS-Q-CLIP

## 2024-01-19 DEVICE — 3DMAX™ MID ANATOMICAL MESH, LARGE, RIGHT, 4” X 6”, 10 X 16 CM
Type: IMPLANTABLE DEVICE | Site: ABDOMEN | Status: FUNCTIONAL
Brand: 3DMAX™ MID ANATOMICAL MESH

## 2024-01-19 DEVICE — FIXATION DEVICE;15 VIOLET ABSORBABLE TACKS
Type: IMPLANTABLE DEVICE | Site: ABDOMEN | Status: FUNCTIONAL
Brand: ABSORBATACK

## 2024-01-19 RX ORDER — SODIUM CHLORIDE 0.9 % (FLUSH) 0.9 %
10 SYRINGE (ML) INJECTION AS NEEDED
Status: DISCONTINUED | OUTPATIENT
Start: 2024-01-19 | End: 2024-01-19 | Stop reason: HOSPADM

## 2024-01-19 RX ORDER — OXYCODONE HCL 10 MG/1
10 TABLET, FILM COATED, EXTENDED RELEASE ORAL ONCE
Status: COMPLETED | OUTPATIENT
Start: 2024-01-19 | End: 2024-01-19

## 2024-01-19 RX ORDER — SODIUM CHLORIDE 0.9 % (FLUSH) 0.9 %
3 SYRINGE (ML) INJECTION EVERY 12 HOURS SCHEDULED
Status: DISCONTINUED | OUTPATIENT
Start: 2024-01-19 | End: 2024-01-19 | Stop reason: HOSPADM

## 2024-01-19 RX ORDER — ONDANSETRON 2 MG/ML
4 INJECTION INTRAMUSCULAR; INTRAVENOUS EVERY 6 HOURS PRN
Status: DISCONTINUED | OUTPATIENT
Start: 2024-01-19 | End: 2024-01-19 | Stop reason: HOSPADM

## 2024-01-19 RX ORDER — CEFAZOLIN SODIUM 2 G/100ML
2000 INJECTION, SOLUTION INTRAVENOUS ONCE
Status: COMPLETED | OUTPATIENT
Start: 2024-01-19 | End: 2024-01-19

## 2024-01-19 RX ORDER — MIDAZOLAM HYDROCHLORIDE 1 MG/ML
1 INJECTION INTRAMUSCULAR; INTRAVENOUS
Status: DISCONTINUED | OUTPATIENT
Start: 2024-01-19 | End: 2024-01-19 | Stop reason: HOSPADM

## 2024-01-19 RX ORDER — SODIUM CHLORIDE 9 MG/ML
INJECTION, SOLUTION INTRAVENOUS AS NEEDED
Status: DISCONTINUED | OUTPATIENT
Start: 2024-01-19 | End: 2024-01-19 | Stop reason: HOSPADM

## 2024-01-19 RX ORDER — FENTANYL CITRATE 50 UG/ML
50 INJECTION, SOLUTION INTRAMUSCULAR; INTRAVENOUS
Status: DISCONTINUED | OUTPATIENT
Start: 2024-01-19 | End: 2024-01-19 | Stop reason: HOSPADM

## 2024-01-19 RX ORDER — LIDOCAINE HYDROCHLORIDE 10 MG/ML
0.5 INJECTION, SOLUTION INFILTRATION; PERINEURAL ONCE AS NEEDED
Status: DISCONTINUED | OUTPATIENT
Start: 2024-01-19 | End: 2024-01-19 | Stop reason: HOSPADM

## 2024-01-19 RX ORDER — NALOXONE HCL 0.4 MG/ML
0.2 VIAL (ML) INJECTION AS NEEDED
Status: DISCONTINUED | OUTPATIENT
Start: 2024-01-19 | End: 2024-01-19 | Stop reason: HOSPADM

## 2024-01-19 RX ORDER — PHENYLEPHRINE HCL IN 0.9% NACL 1 MG/10 ML
SYRINGE (ML) INTRAVENOUS AS NEEDED
Status: DISCONTINUED | OUTPATIENT
Start: 2024-01-19 | End: 2024-01-19 | Stop reason: SURG

## 2024-01-19 RX ORDER — SODIUM CHLORIDE 0.9 % (FLUSH) 0.9 %
3-10 SYRINGE (ML) INJECTION AS NEEDED
Status: DISCONTINUED | OUTPATIENT
Start: 2024-01-19 | End: 2024-01-19 | Stop reason: HOSPADM

## 2024-01-19 RX ORDER — KETOROLAC TROMETHAMINE 30 MG/ML
INJECTION, SOLUTION INTRAMUSCULAR; INTRAVENOUS AS NEEDED
Status: DISCONTINUED | OUTPATIENT
Start: 2024-01-19 | End: 2024-01-19 | Stop reason: SURG

## 2024-01-19 RX ORDER — IPRATROPIUM BROMIDE AND ALBUTEROL SULFATE 2.5; .5 MG/3ML; MG/3ML
3 SOLUTION RESPIRATORY (INHALATION) ONCE AS NEEDED
Status: DISCONTINUED | OUTPATIENT
Start: 2024-01-19 | End: 2024-01-19 | Stop reason: HOSPADM

## 2024-01-19 RX ORDER — SODIUM CHLORIDE, SODIUM LACTATE, POTASSIUM CHLORIDE, CALCIUM CHLORIDE 600; 310; 30; 20 MG/100ML; MG/100ML; MG/100ML; MG/100ML
9 INJECTION, SOLUTION INTRAVENOUS CONTINUOUS
Status: DISCONTINUED | OUTPATIENT
Start: 2024-01-19 | End: 2024-01-19 | Stop reason: HOSPADM

## 2024-01-19 RX ORDER — HYDROCODONE BITARTRATE AND ACETAMINOPHEN 5; 325 MG/1; MG/1
1 TABLET ORAL ONCE AS NEEDED
Status: COMPLETED | OUTPATIENT
Start: 2024-01-19 | End: 2024-01-19

## 2024-01-19 RX ORDER — EPHEDRINE SULFATE 50 MG/ML
5 INJECTION, SOLUTION INTRAVENOUS ONCE AS NEEDED
Status: DISCONTINUED | OUTPATIENT
Start: 2024-01-19 | End: 2024-01-19 | Stop reason: HOSPADM

## 2024-01-19 RX ORDER — HYDROMORPHONE HYDROCHLORIDE 1 MG/ML
0.5 INJECTION, SOLUTION INTRAMUSCULAR; INTRAVENOUS; SUBCUTANEOUS
Status: DISCONTINUED | OUTPATIENT
Start: 2024-01-19 | End: 2024-01-19 | Stop reason: HOSPADM

## 2024-01-19 RX ORDER — ONDANSETRON 2 MG/ML
4 INJECTION INTRAMUSCULAR; INTRAVENOUS ONCE AS NEEDED
Status: COMPLETED | OUTPATIENT
Start: 2024-01-19 | End: 2024-01-19

## 2024-01-19 RX ORDER — FENTANYL CITRATE 50 UG/ML
INJECTION, SOLUTION INTRAMUSCULAR; INTRAVENOUS AS NEEDED
Status: DISCONTINUED | OUTPATIENT
Start: 2024-01-19 | End: 2024-01-19 | Stop reason: SURG

## 2024-01-19 RX ORDER — ACETAMINOPHEN 500 MG
1000 TABLET ORAL ONCE
Status: COMPLETED | OUTPATIENT
Start: 2024-01-19 | End: 2024-01-19

## 2024-01-19 RX ORDER — LABETALOL HYDROCHLORIDE 5 MG/ML
5 INJECTION, SOLUTION INTRAVENOUS
Status: DISCONTINUED | OUTPATIENT
Start: 2024-01-19 | End: 2024-01-19 | Stop reason: HOSPADM

## 2024-01-19 RX ORDER — FLUMAZENIL 0.1 MG/ML
0.2 INJECTION INTRAVENOUS AS NEEDED
Status: DISCONTINUED | OUTPATIENT
Start: 2024-01-19 | End: 2024-01-19 | Stop reason: HOSPADM

## 2024-01-19 RX ORDER — OXYCODONE AND ACETAMINOPHEN 7.5; 325 MG/1; MG/1
1 TABLET ORAL EVERY 4 HOURS PRN
Status: DISCONTINUED | OUTPATIENT
Start: 2024-01-19 | End: 2024-01-19 | Stop reason: HOSPADM

## 2024-01-19 RX ORDER — PROMETHAZINE HYDROCHLORIDE 25 MG/1
25 SUPPOSITORY RECTAL ONCE AS NEEDED
Status: DISCONTINUED | OUTPATIENT
Start: 2024-01-19 | End: 2024-01-19 | Stop reason: HOSPADM

## 2024-01-19 RX ORDER — HYDRALAZINE HYDROCHLORIDE 20 MG/ML
5 INJECTION INTRAMUSCULAR; INTRAVENOUS
Status: DISCONTINUED | OUTPATIENT
Start: 2024-01-19 | End: 2024-01-19 | Stop reason: HOSPADM

## 2024-01-19 RX ORDER — PROPOFOL 10 MG/ML
INJECTION, EMULSION INTRAVENOUS AS NEEDED
Status: DISCONTINUED | OUTPATIENT
Start: 2024-01-19 | End: 2024-01-19 | Stop reason: SURG

## 2024-01-19 RX ORDER — PROMETHAZINE HYDROCHLORIDE 25 MG/1
25 TABLET ORAL ONCE AS NEEDED
Status: DISCONTINUED | OUTPATIENT
Start: 2024-01-19 | End: 2024-01-19 | Stop reason: HOSPADM

## 2024-01-19 RX ORDER — BUPIVACAINE HYDROCHLORIDE AND EPINEPHRINE 5; 5 MG/ML; UG/ML
INJECTION, SOLUTION EPIDURAL; INTRACAUDAL; PERINEURAL AS NEEDED
Status: DISCONTINUED | OUTPATIENT
Start: 2024-01-19 | End: 2024-01-19 | Stop reason: HOSPADM

## 2024-01-19 RX ORDER — DEXAMETHASONE SODIUM PHOSPHATE 4 MG/ML
INJECTION, SOLUTION INTRA-ARTICULAR; INTRALESIONAL; INTRAMUSCULAR; INTRAVENOUS; SOFT TISSUE AS NEEDED
Status: DISCONTINUED | OUTPATIENT
Start: 2024-01-19 | End: 2024-01-19 | Stop reason: SURG

## 2024-01-19 RX ORDER — ACETAMINOPHEN 500 MG
1000 TABLET ORAL 3 TIMES DAILY
Qty: 18 TABLET | Refills: 0 | Status: SHIPPED | OUTPATIENT
Start: 2024-01-19 | End: 2024-01-22

## 2024-01-19 RX ORDER — SODIUM CHLORIDE 9 MG/ML
40 INJECTION, SOLUTION INTRAVENOUS AS NEEDED
Status: DISCONTINUED | OUTPATIENT
Start: 2024-01-19 | End: 2024-01-19 | Stop reason: HOSPADM

## 2024-01-19 RX ORDER — CELECOXIB 200 MG/1
200 CAPSULE ORAL ONCE
Status: COMPLETED | OUTPATIENT
Start: 2024-01-19 | End: 2024-01-19

## 2024-01-19 RX ORDER — DIPHENHYDRAMINE HYDROCHLORIDE 50 MG/ML
12.5 INJECTION INTRAMUSCULAR; INTRAVENOUS
Status: DISCONTINUED | OUTPATIENT
Start: 2024-01-19 | End: 2024-01-19 | Stop reason: HOSPADM

## 2024-01-19 RX ORDER — PROPRANOLOL HYDROCHLORIDE 10 MG/1
10 TABLET ORAL ONCE
Status: COMPLETED | OUTPATIENT
Start: 2024-01-19 | End: 2024-01-19

## 2024-01-19 RX ORDER — LIDOCAINE HYDROCHLORIDE 20 MG/ML
INJECTION, SOLUTION INFILTRATION; PERINEURAL AS NEEDED
Status: DISCONTINUED | OUTPATIENT
Start: 2024-01-19 | End: 2024-01-19 | Stop reason: SURG

## 2024-01-19 RX ORDER — ROCURONIUM BROMIDE 10 MG/ML
INJECTION, SOLUTION INTRAVENOUS AS NEEDED
Status: DISCONTINUED | OUTPATIENT
Start: 2024-01-19 | End: 2024-01-19 | Stop reason: SURG

## 2024-01-19 RX ORDER — DROPERIDOL 2.5 MG/ML
0.62 INJECTION, SOLUTION INTRAMUSCULAR; INTRAVENOUS
Status: DISCONTINUED | OUTPATIENT
Start: 2024-01-19 | End: 2024-01-19 | Stop reason: HOSPADM

## 2024-01-19 RX ORDER — ONDANSETRON 4 MG/1
4 TABLET, FILM COATED ORAL EVERY 8 HOURS PRN
Qty: 10 TABLET | Refills: 0 | Status: SHIPPED | OUTPATIENT
Start: 2024-01-19 | End: 2024-01-29

## 2024-01-19 RX ORDER — TRAMADOL HYDROCHLORIDE 50 MG/1
50 TABLET ORAL EVERY 6 HOURS PRN
Qty: 7 TABLET | Refills: 0 | Status: SHIPPED | OUTPATIENT
Start: 2024-01-19

## 2024-01-19 RX ORDER — SCOLOPAMINE TRANSDERMAL SYSTEM 1 MG/1
1 PATCH, EXTENDED RELEASE TRANSDERMAL ONCE
Status: DISCONTINUED | OUTPATIENT
Start: 2024-01-19 | End: 2024-01-19 | Stop reason: HOSPADM

## 2024-01-19 RX ORDER — GLYCOPYRROLATE 0.2 MG/ML
INJECTION INTRAMUSCULAR; INTRAVENOUS AS NEEDED
Status: DISCONTINUED | OUTPATIENT
Start: 2024-01-19 | End: 2024-01-19 | Stop reason: SURG

## 2024-01-19 RX ADMIN — PROPRANOLOL HYDROCHLORIDE 10 MG: 10 TABLET ORAL at 06:46

## 2024-01-19 RX ADMIN — SCOPALAMINE 1 PATCH: 1 PATCH, EXTENDED RELEASE TRANSDERMAL at 06:45

## 2024-01-19 RX ADMIN — DEXAMETHASONE SODIUM PHOSPHATE 4 MG: 4 INJECTION, SOLUTION INTRAMUSCULAR; INTRAVENOUS at 08:39

## 2024-01-19 RX ADMIN — ROCURONIUM BROMIDE 50 MG: 10 INJECTION, SOLUTION INTRAVENOUS at 07:52

## 2024-01-19 RX ADMIN — OXYCODONE HYDROCHLORIDE 10 MG: 10 TABLET, FILM COATED, EXTENDED RELEASE ORAL at 06:46

## 2024-01-19 RX ADMIN — GLYCOPYRROLATE 0.2 MCG: 0.2 INJECTION INTRAMUSCULAR; INTRAVENOUS at 08:06

## 2024-01-19 RX ADMIN — CEFAZOLIN SODIUM 2000 MG: 2 INJECTION, SOLUTION INTRAVENOUS at 07:42

## 2024-01-19 RX ADMIN — Medication 100 MCG: at 08:49

## 2024-01-19 RX ADMIN — ACETAMINOPHEN 1000 MG: 500 TABLET ORAL at 06:46

## 2024-01-19 RX ADMIN — KETOROLAC TROMETHAMINE 30 MG: 30 INJECTION, SOLUTION INTRAMUSCULAR; INTRAVENOUS at 08:39

## 2024-01-19 RX ADMIN — SODIUM CHLORIDE, POTASSIUM CHLORIDE, SODIUM LACTATE AND CALCIUM CHLORIDE 9 ML/HR: 600; 310; 30; 20 INJECTION, SOLUTION INTRAVENOUS at 06:45

## 2024-01-19 RX ADMIN — FENTANYL CITRATE 50 MCG: 50 INJECTION, SOLUTION INTRAMUSCULAR; INTRAVENOUS at 07:50

## 2024-01-19 RX ADMIN — PROPOFOL 120 MG: 10 INJECTION, EMULSION INTRAVENOUS at 07:52

## 2024-01-19 RX ADMIN — ONDANSETRON HYDROCHLORIDE 4 MG: 2 INJECTION, SOLUTION INTRAMUSCULAR; INTRAVENOUS at 10:04

## 2024-01-19 RX ADMIN — Medication 100 MCG: at 08:05

## 2024-01-19 RX ADMIN — CELECOXIB 200 MG: 200 CAPSULE ORAL at 06:47

## 2024-01-19 RX ADMIN — HYDROCODONE BITARTRATE AND ACETAMINOPHEN 1 TABLET: 5; 325 TABLET ORAL at 10:14

## 2024-01-19 RX ADMIN — Medication 700 MCG: at 09:04

## 2024-01-19 RX ADMIN — MIDAZOLAM 1 MG: 1 INJECTION INTRAMUSCULAR; INTRAVENOUS at 07:40

## 2024-01-19 RX ADMIN — ONDANSETRON 4 MG: 2 INJECTION INTRAMUSCULAR; INTRAVENOUS at 08:43

## 2024-01-19 RX ADMIN — LIDOCAINE HYDROCHLORIDE 100 MG: 20 INJECTION, SOLUTION INFILTRATION; PERINEURAL at 07:52

## 2024-01-19 RX ADMIN — GLYCOPYRROLATE 0.2 MCG: 0.2 INJECTION INTRAMUSCULAR; INTRAVENOUS at 08:10

## 2024-01-19 RX ADMIN — Medication 100 MCG: at 08:15

## 2024-01-19 RX ADMIN — SUGAMMADEX 200 MG: 100 INJECTION, SOLUTION INTRAVENOUS at 09:31

## 2024-01-19 NOTE — ANESTHESIA PREPROCEDURE EVALUATION
Anesthesia Evaluation     Patient summary reviewed and Nursing notes reviewed   history of anesthetic complications:  PONV  NPO Solid Status: > 8 hours  NPO Liquid Status: > 2 hours           Airway   Mallampati: II  TM distance: >3 FB  Neck ROM: full  Dental      Pulmonary    (+) asthma,  Cardiovascular     ECG reviewed    (+) hypertension    ROS comment: Stress test and echo rogelio in 12/23    Neuro/Psych  (+) psychiatric history Depression  GI/Hepatic/Renal/Endo      Musculoskeletal     Abdominal    Substance History      OB/GYN          Other                          Anesthesia Plan    ASA 2     general     intravenous induction     Anesthetic plan, risks, benefits, and alternatives have been provided, discussed and informed consent has been obtained with: patient.        CODE STATUS:

## 2024-01-19 NOTE — ANESTHESIA POSTPROCEDURE EVALUATION
Patient: Erick Quiñones    Procedure Summary       Date: 01/19/24 Room / Location: [facility] OR  / [facility] MAIN OR    Anesthesia Start: 0745 Anesthesia Stop: 0948    Procedures:       RIGHT INGUINAL HERNIA REPAIR LAPAROSCOPIC (Right: Abdomen)      UMBILICAL HERNIA REPAIR, open (Abdomen) Diagnosis:       Right inguinal hernia      Umbilical hernia      (Right inguinal hernia [K40.90])      (Umbilical hernia [K42.9])    Surgeons: Frances Garcia MD Provider: Duglas Morgan MD    Anesthesia Type: general ASA Status: 2            Anesthesia Type: general    Vitals  Vitals Value Taken Time   /80 01/19/24 1000   Temp 36.6 °C (97.9 °F) 01/19/24 0943   Pulse 55 01/19/24 1008   Resp 12 01/19/24 0950   SpO2 99 % 01/19/24 1008   Vitals shown include unfiled device data.        Post Anesthesia Care and Evaluation    Patient location during evaluation: bedside  Patient participation: complete - patient participated  Level of consciousness: awake and alert  Pain management: adequate    Airway patency: patent  Anesthetic complications: No anesthetic complications  PONV Status: controlled  Cardiovascular status: blood pressure returned to baseline and acceptable  Respiratory status: acceptable  Hydration status: acceptable

## 2024-01-19 NOTE — ANESTHESIA PROCEDURE NOTES
Airway  Urgency: elective    Date/Time: 1/19/2024 7:57 AM    General Information and Staff    Patient location during procedure: OR  Anesthesiologist: Duglas Morgan MD  CRNA/CAA: Colby Vides CRNA    Indications and Patient Condition  Indications for airway management: airway protection    Preoxygenated: yes  MILS maintained throughout  Mask difficulty assessment: 1 - vent by mask    Final Airway Details  Final airway type: endotracheal airway      Successful airway: ETT  Cuffed: yes   Successful intubation technique: direct laryngoscopy  Endotracheal tube insertion site: oral  Blade: Michelle  Blade size: 3  ETT size (mm): 7.0  Cormack-Lehane Classification: grade IIb - view of arytenoids or posterior of glottis only  Placement verified by: capnometry   Cuff volume (mL): 5  Measured from: lips  Number of attempts at approach: 1  Assessment: lips, teeth, and gum same as pre-op and atraumatic intubation

## 2024-01-19 NOTE — DISCHARGE INSTRUCTIONS
Last pain pill (Hydrocodone/Norco) given at 10:14am.    Minimize use of Mobic as it may worsen the difficulty swallowing

## 2024-01-19 NOTE — H&P
SURGERY  Erick Quiñones   1970 12/18/23     Chief Complaint:  right inguinal hernia and umbilicus     HPI     Ms. Quiñones is a very nice 53 y.o. female who saw me in 2020 but delayed intervention due to COVID.  Her symptoms have worsened as far as pain, worse when getting ready to use the restroom, with minimal pain at the umbilicus.      CT 12/27/2022 at [facility] with small periumbilical hernia without any other abdominal or pelvic findings.  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  Ordered but not completed.      She describes the right groin as having discomfort.  She has not had her thyroid levels checked for some time, the last TSH being a little above normal limits.  I encouraged her to do this.  She is not always taking her synthroid either.  I counseled the potential for weight gain, decreased metabolism.     Medical History        Past Medical History:   Diagnosis Date    Abnormal ECG      Anemia      Anxiety and depression      Asthma      Disease of thyroid gland      High blood pressure      PONV (postoperative nausea and vomiting)           Surgical History         Past Surgical History:   Procedure Laterality Date    BREAST AUGMENTATION Bilateral                 Family History   Problem Relation Age of Onset    Heart attack Mother      Breast cancer Mother      Colon cancer Mother      Melanoma Mother      Hypertension Mother      Stroke Mother      Hypertension Father      Diabetes type II Father      Diabetes Father      Breast cancer Father      No Known Problems Sister      Breast cancer Maternal Aunt      Skin cancer Maternal Uncle      No Known Problems Paternal Aunt      Skin cancer Maternal Grandmother           face    Ovarian cancer Maternal Grandmother      Melanoma Maternal Grandmother      No Known Problems Maternal Grandfather      No Known Problems Paternal Grandmother      No Known  Problems Paternal Grandfather      No Known Problems Son      No Known Problems Son      BRCA 1/2 Neg Hx      Endometrial cancer Neg Hx        Social History   Social History            Socioeconomic History    Marital status: Single       Spouse name: MORENO    Number of children: 2   Tobacco Use    Smoking status: Never    Smokeless tobacco: Never   Vaping Use    Vaping Use: Never used   Substance and Sexual Activity    Alcohol use: No       Comment: Caffeine use: 3-4 cups daily    Drug use: Never    Sexual activity: Yes       Partners: Male       Birth control/protection: None       Comment: partner = MORENO               Current Outpatient Medications:     ferrous sulfate 325 (65 FE) MG EC tablet, Take 1 tablet by mouth Daily., Disp: , Rfl:     levothyroxine (SYNTHROID, LEVOTHROID) 25 MCG tablet, , Disp: , Rfl:     lisinopril (PRINIVIL,ZESTRIL) 2.5 MG tablet, Take  by mouth Daily., Disp: , Rfl:     propranolol (INDERAL) 10 MG tablet, Take 1 tablet by mouth., Disp: , Rfl:     vitamin D (ERGOCALCIFEROL) 1.25 MG (42512 UT) capsule capsule, , Disp: , Rfl:      No Known Allergies     PHYSICAL EXAM:     LMP 07/10/2023 (Exact Date)   There is no height or weight on file to calculate BMI.  BMI is >= 25 and <30. (Overweight) The following options were offered after discussion;: get her thyroid levels checkes     Constitutional: well developed, well nourished, appears to have excess weight, healthy  ENMT: Hearing intact, neck without masses  CVS: RRR, no murmur  Respiratory: CTA, normal respiratory effort   Gastrointestinal: abdomen soft, nontender, abdominal hernia tiny at umbilicus  Genitourinary: inguinal hernia palpable on the right  Musculoskeletal: gait normal, muscle mass normal  Neurological: awake and alert, seems to have reasonable capacity for understanding for medical decision making  Psychiatric: appears to have reasonable judgement, pleasant     Radiographic/Lab Findings:        Reviewed: hernia repair  process     IMPRESSION:  Right inguinal hernia with tiny umbilical hernia  Hypothyroidism  HTN on lisinopril, inderal  Heavy menses on oral iron  Possible papillary neoplasm with repeat CT 1 year per PCP.     PLAN:  Laparoscopic right inguinal hernia repair and open umbilical hernia repair  See PCP for thyroid check and be compliant with meds.  Follow up on CT of pancreas thru PCP.  Omit lisinopril day of surgery

## 2024-01-29 ENCOUNTER — OFFICE VISIT (OUTPATIENT)
Dept: SURGERY | Facility: CLINIC | Age: 54
End: 2024-01-29
Payer: COMMERCIAL

## 2024-01-29 VITALS
WEIGHT: 163 LBS | BODY MASS INDEX: 26.2 KG/M2 | DIASTOLIC BLOOD PRESSURE: 78 MMHG | HEIGHT: 66 IN | SYSTOLIC BLOOD PRESSURE: 126 MMHG

## 2024-01-29 DIAGNOSIS — K42.9 UMBILICAL HERNIA WITHOUT OBSTRUCTION AND WITHOUT GANGRENE: ICD-10-CM

## 2024-01-29 DIAGNOSIS — K40.90 RIGHT INGUINAL HERNIA: Primary | ICD-10-CM

## 2024-01-29 PROCEDURE — 99024 POSTOP FOLLOW-UP VISIT: CPT | Performed by: SURGERY

## 2024-01-29 NOTE — PROGRESS NOTES
SURGERY: HUMBERTO  Post op Visit  Erick Quiñones  01/29/2024    Ms. Quiñones  presents today after having undergone Surgery 1/19/2024, of a laparoscopic right inguinal hernia repair and open umbilical.  This was for a wide based right inguinal hernia and a very small umbilical hernia.      Today, she looks great.  She is having some burning.  She did take some tramadol.  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  She needs to be off work 3 weeks for teaching, 4 weeks for caregiving.  Her incisions are good.      Frances Garcia MD  15:09 EST

## 2024-01-29 NOTE — PATIENT INSTRUCTIONS
Surgery.  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    Frances Garcia MD  01/29/24

## 2024-05-22 ENCOUNTER — OFFICE VISIT (OUTPATIENT)
Dept: CARDIOLOGY | Facility: CLINIC | Age: 54
End: 2024-05-22
Payer: COMMERCIAL

## 2024-05-22 VITALS
DIASTOLIC BLOOD PRESSURE: 98 MMHG | HEART RATE: 71 BPM | SYSTOLIC BLOOD PRESSURE: 134 MMHG | OXYGEN SATURATION: 97 % | HEIGHT: 66 IN | BODY MASS INDEX: 27.03 KG/M2 | WEIGHT: 168.2 LBS

## 2024-05-22 DIAGNOSIS — R06.09 EXERTIONAL DYSPNEA: ICD-10-CM

## 2024-05-22 DIAGNOSIS — R07.2 PRECORDIAL PAIN: Primary | ICD-10-CM

## 2024-05-22 PROCEDURE — 99213 OFFICE O/P EST LOW 20 MIN: CPT | Performed by: INTERNAL MEDICINE

## 2024-05-22 NOTE — PROGRESS NOTES
[facility]      Patient Name: Erick Quiñones  :1970  Age: 53 y.o.  Encounter Provider:  Kelby Mccarty Jr, MD      Chief Complaint: Follow-up evaluation and management of chest pain      HPI  Erick Quiñones is a 53 y.o. female presents for follow-up evaluation and management of chest pain.     Last clinic visit note: She works out 3-4 times weekly on a stationary bicycle and states that when she pushes too hard she will have a sharp discomfort which is worse with deep inspiration.  Does not happen every time she works out and sometimes it happens while she is sleeping.  She notes some mild increase in dyspnea on exertion when going up the stairs which she feels is new to her.  She states that she has gained a bit of weight over the last year and wants to be started on phentermine but given the constellation of complaints she was sent for cardiology evaluation before starting on appetite suppressants.  She had history of elevated blood pressure but was taken off of antihypertensives.  She takes propranolol as needed for anxiety.  She is being treated for thyroid disease.  She is a lifelong non-smoker who denies alcohol or illicit drug use.  Her mother had a heart attack at age 79.    She did not schedule the stress test that was recommended at last visit as she was concerned about conditions surrounding healthcare regarding the pandemic.  She states that her blood pressure has been spiking and she has increased social stressors.  She is now on propranolol and lisinopril.  There has been a significant progression of chest pain that she describes as sharp with radiation to the back and exacerbated by physical activity.  This is forced her to stop anything more than mild activity.  She denies orthopnea, PND or edema.  No palpitations, dizziness or syncope.  There is certainly a pleuritic component to her chest discomfort.  She was seen in the emergency room on 2023 at which time a  CTA chest was performed.  No pulmonary embolus or evidence for other intrathoracic pathology.  I have reviewed the images and there was no sign of coronary calcium on this venous phase study.    After negative stress study and echocardiogram she started to exercise more.  She has been completely asymptomatic and continues to go to the gym 5 days/week.  She is considering plastic surgery and is asking about cardiac clearance.  She had an EKG performed at [place] yesterday but we are unable to see the tracing or the report in care everywhere.  Blood pressure shows diastolic blood pressures a little high but she has been checking it at home and it seems well-controlled.  She denies angina.  No orthopnea, PND or edema.  No palpitations, dizziness or syncope.  She had a right hernia repair in January 2024 with no cardiac complications.    The following portions of the patient's history were reviewed and updated as appropriate: allergies, current medications, past family history, past medical history, past social history, past surgical history and problem list.      Review of Systems   Constitutional: Negative for chills and fever.   HENT:  Negative for hoarse voice and sore throat.    Eyes:  Negative for double vision and photophobia.   Cardiovascular:  Positive for chest pain and dyspnea on exertion. Negative for leg swelling, near-syncope, orthopnea, palpitations, paroxysmal nocturnal dyspnea and syncope.   Respiratory:  Negative for cough and wheezing.    Skin:  Negative for poor wound healing and rash.   Musculoskeletal:  Negative for arthritis and joint swelling.   Gastrointestinal:  Negative for bloating, abdominal pain, hematemesis and hematochezia.   Neurological:  Negative for dizziness and focal weakness.   Psychiatric/Behavioral:  Negative for depression and suicidal ideas.        OBJECTIVE:   Vital Signs  Vitals:    05/22/24 1246   BP: 134/98   Pulse: 71   SpO2: 97%     Estimated body mass index is 27.15  "kg/m² as calculated from the following:    Height as of this encounter: 167.6 cm (66\").    Weight as of this encounter: 76.3 kg (168 lb 3.2 oz).    Vitals reviewed.   Constitutional:       Appearance: Healthy appearance. Not in distress.   Neck:      Vascular: No JVR. JVD normal.   Pulmonary:      Effort: Pulmonary effort is normal.      Breath sounds: Normal breath sounds. No wheezing. No rhonchi. No rales.   Chest:      Chest wall: Not tender to palpatation.   Cardiovascular:      PMI at left midclavicular line. Normal rate. Regular rhythm. Normal S1. Normal S2.       Murmurs: There is no murmur.      No gallop.  No click. No rub.   Pulses:     Intact distal pulses.   Edema:     Peripheral edema absent.   Abdominal:      General: Bowel sounds are normal.      Palpations: Abdomen is soft.      Tenderness: There is no abdominal tenderness.   Musculoskeletal: Normal range of motion.         General: No tenderness. Skin:     General: Skin is warm and dry.   Neurological:      General: No focal deficit present.      Mental Status: Alert and oriented to person, place and time.       Procedures          ASSESSMENT:     Precordial pain  Exertional dyspnea  Hypothyroidism    PLAN OF CARE:     Precordial pain -negative stress study and normal echocardiogram.  Asymptomatic at this time..  Exertional dyspnea -resolved.  Normal testing.  Hypothyroidism -defer to PCP for ongoing work-up and management    I will request EKG from [place] before filling out cardiac clearance paperwork.  Cardiovascular issues are stable with normal cardiac workup and I will see the patient as needed.             Discharge Medications            Accurate as of May 22, 2024 12:49 PM. If you have any questions, ask your nurse or doctor.                Continue These Medications        Instructions Start Date   ferrous sulfate 325 (65 FE) MG EC tablet   325 mg, Oral, Daily      hydrOXYzine 25 MG tablet  Commonly known as: ATARAX   25 mg, Oral, As " Needed      levocetirizine 5 MG tablet  Commonly known as: XYZAL   5 mg, Oral, Every Evening      levothyroxine 50 MCG tablet  Commonly known as: SYNTHROID, LEVOTHROID   50 mcg, Oral, Daily      propranolol 10 MG tablet  Commonly known as: INDERAL   10 mg, Oral, 2 Times Daily      sertraline 50 MG tablet  Commonly known as: ZOLOFT   50 mg, Oral, Daily      traMADol 50 MG tablet  Commonly known as: ULTRAM   Take 1 tablet by mouth Every 6 (Six) Hours As Needed for Moderate Pain -only if unrelieved by advil or tylenol      vitamin D 1.25 MG (79286 UT) capsule capsule  Commonly known as: ERGOCALCIFEROL   50,000 Units, Oral, Every 7 Days, SATURDAYS                Thank you for allowing me to participate in the care of your patient,      Sincerely,   Kelby Mccarty MD  [facility]  05/22/24  12:49 EDT

## 2024-05-27 ENCOUNTER — PATIENT MESSAGE (OUTPATIENT)
Dept: CARDIOLOGY | Facility: CLINIC | Age: 54
End: 2024-05-27
Payer: COMMERCIAL

## 2024-05-28 NOTE — TELEPHONE ENCOUNTER
I called and s/w pt.  She is requesting the clearance be faxed to her PCP.  I faxed a copy of the clearance to [phone]./amaris

## 2024-06-21 ENCOUNTER — TELEPHONE (OUTPATIENT)
Dept: OBSTETRICS AND GYNECOLOGY | Age: 54
End: 2024-06-21
Payer: COMMERCIAL

## 2024-06-21 DIAGNOSIS — Z12.31 ENCOUNTER FOR SCREENING MAMMOGRAM FOR MALIGNANT NEOPLASM OF BREAST: Primary | ICD-10-CM

## 2025-07-30 ENCOUNTER — TELEPHONE (OUTPATIENT)
Dept: OBSTETRICS AND GYNECOLOGY | Age: 55
End: 2025-07-30
Payer: COMMERCIAL

## 2025-07-30 NOTE — TELEPHONE ENCOUNTER
7/30/2025 [facility] sent over a fax request for a mammogram order on pt that is scheduled for 8/1/25. Pt has not been seen in our office since 12/2023. I called pt and left VM for her to call office and get an annual appt scheduled if she would still like to be seen by our office. I called [facility] and let them know I could not send order until pt has appt scheduled with us.

## (undated) DEVICE — PENCL ES MEGADINE EZ/CLEAN BUTN W/HOLSTR 10FT

## (undated) DEVICE — DISPOSABLE MONOPOLAR ENDOSCOPIC CORD 10 FT. (3M): Brand: KIRWAN

## (undated) DEVICE — TUBING, SUCTION, 1/4" X 20', STRAIGHT: Brand: MEDLINE INDUSTRIES, INC.

## (undated) DEVICE — ANTIBACTERIAL UNDYED BRAIDED (POLYGLACTIN 910), SYNTHETIC ABSORBABLE SUTURE: Brand: COATED VICRYL

## (undated) DEVICE — GLV SURG BIOGEL M LTX PF 6 1/2

## (undated) DEVICE — LOU LAP CHOLE: Brand: MEDLINE INDUSTRIES, INC.

## (undated) DEVICE — SUT VIC 3/0 CT2 27IN J232H

## (undated) DEVICE — DRSNG SURESITE WNDW 4X4.5

## (undated) DEVICE — ELECTRD BLD EZ CLN MOD XLNG 2.75IN

## (undated) DEVICE — LAPAROSCOPIC TROCAR SLEEVE/SINGLE USE: Brand: KII® LOW PROFILE OPTICAL DUAL PACK

## (undated) DEVICE — TRAP FLD MINIVAC MEGADYNE 100ML

## (undated) DEVICE — NDL HYPO ECLPS SFTY 22G 1 1/2IN

## (undated) DEVICE — LOU MINOR PROCEDURE: Brand: MEDLINE INDUSTRIES, INC.

## (undated) DEVICE — BLUNT TIP TROCAR: Brand: AUTO SUTURE

## (undated) DEVICE — LABEL SHEET CUSTOM 2X2 YELLOW: Brand: MEDLINE INDUSTRIES, INC.

## (undated) DEVICE — LAPAROVUE VISIBILITY SYSTEM LAPAROSCOPIC SOLUTIONS: Brand: LAPAROVUE

## (undated) DEVICE — OVAL SHAPE BALLOON: Brand: EXTRA VIEW

## (undated) DEVICE — MEDI-VAC YANKAUER SUCTION HANDLE W/BULBOUS TIP: Brand: CARDINAL HEALTH

## (undated) DEVICE — SUT VIC 0 TN 27IN DYED JTN0G

## (undated) DEVICE — PATIENT RETURN ELECTRODE, SINGLE-USE, CONTACT QUALITY MONITORING, ADULT, WITH 9FT CORD, FOR PATIENTS WEIGING OVER 33LBS. (15KG): Brand: MEGADYNE

## (undated) DEVICE — SUT VIC 5/0 PS2 18IN J495H

## (undated) DEVICE — ADHS SKIN SURG TISS VISC PREMIERPRO EXOFIN HI/VISC FAST/DRY

## (undated) DEVICE — SUT VIC 3/0 TIES 18IN J110T

## (undated) DEVICE — LAPAROSCOPIC SMOKE FILTRATION SYSTEM: Brand: PALL LAPAROSHIELD® PLUS LAPAROSCOPIC SMOKE FILTRATION SYSTEM

## (undated) DEVICE — APPL CHLORAPREP HI/LITE 26ML ORNG